# Patient Record
Sex: FEMALE | Race: ASIAN | NOT HISPANIC OR LATINO
[De-identification: names, ages, dates, MRNs, and addresses within clinical notes are randomized per-mention and may not be internally consistent; named-entity substitution may affect disease eponyms.]

---

## 2023-02-22 PROBLEM — Z00.00 ENCOUNTER FOR PREVENTIVE HEALTH EXAMINATION: Status: ACTIVE | Noted: 2023-02-22

## 2023-02-24 ENCOUNTER — APPOINTMENT (OUTPATIENT)
Dept: PULMONOLOGY | Facility: CLINIC | Age: 78
End: 2023-02-24
Payer: MEDICARE

## 2023-02-24 VITALS
HEART RATE: 93 BPM | SYSTOLIC BLOOD PRESSURE: 118 MMHG | BODY MASS INDEX: 26.06 KG/M2 | WEIGHT: 138 LBS | OXYGEN SATURATION: 99 % | HEIGHT: 61 IN | DIASTOLIC BLOOD PRESSURE: 70 MMHG

## 2023-02-24 PROCEDURE — 94729 DIFFUSING CAPACITY: CPT

## 2023-02-24 PROCEDURE — 94010 BREATHING CAPACITY TEST: CPT

## 2023-02-24 PROCEDURE — 94726 PLETHYSMOGRAPHY LUNG VOLUMES: CPT

## 2023-03-02 ENCOUNTER — EMERGENCY (EMERGENCY)
Facility: HOSPITAL | Age: 78
LOS: 1 days | Discharge: HOME | End: 2023-03-02
Attending: EMERGENCY MEDICINE | Admitting: EMERGENCY MEDICINE
Payer: MEDICARE

## 2023-03-02 VITALS
HEART RATE: 104 BPM | TEMPERATURE: 99 F | RESPIRATION RATE: 18 BRPM | DIASTOLIC BLOOD PRESSURE: 62 MMHG | SYSTOLIC BLOOD PRESSURE: 134 MMHG | OXYGEN SATURATION: 98 %

## 2023-03-02 VITALS
OXYGEN SATURATION: 100 % | RESPIRATION RATE: 18 BRPM | HEART RATE: 102 BPM | TEMPERATURE: 98 F | SYSTOLIC BLOOD PRESSURE: 130 MMHG | DIASTOLIC BLOOD PRESSURE: 71 MMHG

## 2023-03-02 LAB
ALBUMIN SERPL ELPH-MCNC: 3.3 G/DL — SIGNIFICANT CHANGE UP (ref 3.3–5)
ALP SERPL-CCNC: 122 U/L — HIGH (ref 40–120)
ALT FLD-CCNC: 11 U/L — SIGNIFICANT CHANGE UP (ref 4–33)
ANION GAP SERPL CALC-SCNC: 12 MMOL/L — SIGNIFICANT CHANGE UP (ref 7–14)
ANISOCYTOSIS BLD QL: SLIGHT — SIGNIFICANT CHANGE UP
AST SERPL-CCNC: 9 U/L — SIGNIFICANT CHANGE UP (ref 4–32)
BASOPHILS # BLD AUTO: 0 K/UL — SIGNIFICANT CHANGE UP (ref 0–0.2)
BASOPHILS NFR BLD AUTO: 0 % — SIGNIFICANT CHANGE UP (ref 0–2)
BILIRUB SERPL-MCNC: 0.3 MG/DL — SIGNIFICANT CHANGE UP (ref 0.2–1.2)
BUN SERPL-MCNC: 14 MG/DL — SIGNIFICANT CHANGE UP (ref 7–23)
CALCIUM SERPL-MCNC: 9.5 MG/DL — SIGNIFICANT CHANGE UP (ref 8.4–10.5)
CHLORIDE SERPL-SCNC: 94 MMOL/L — LOW (ref 98–107)
CO2 SERPL-SCNC: 24 MMOL/L — SIGNIFICANT CHANGE UP (ref 22–31)
CREAT SERPL-MCNC: 0.81 MG/DL — SIGNIFICANT CHANGE UP (ref 0.5–1.3)
EGFR: 76 ML/MIN/1.73M2 — SIGNIFICANT CHANGE UP
ELLIPTOCYTES BLD QL SMEAR: SLIGHT — SIGNIFICANT CHANGE UP
EOSINOPHIL # BLD AUTO: 0 K/UL — SIGNIFICANT CHANGE UP (ref 0–0.5)
EOSINOPHIL NFR BLD AUTO: 0 % — SIGNIFICANT CHANGE UP (ref 0–6)
FLUAV AG NPH QL: SIGNIFICANT CHANGE UP
FLUBV AG NPH QL: SIGNIFICANT CHANGE UP
GLUCOSE SERPL-MCNC: 120 MG/DL — HIGH (ref 70–99)
HCT VFR BLD CALC: 32.7 % — LOW (ref 34.5–45)
HGB BLD-MCNC: 10.7 G/DL — LOW (ref 11.5–15.5)
HYPOCHROMIA BLD QL: SIGNIFICANT CHANGE UP
IANC: 21.73 K/UL — HIGH (ref 1.8–7.4)
LIDOCAIN IGE QN: 29 U/L — SIGNIFICANT CHANGE UP (ref 7–60)
LYMPHOCYTES # BLD AUTO: 1.14 K/UL — SIGNIFICANT CHANGE UP (ref 1–3.3)
LYMPHOCYTES # BLD AUTO: 4.4 % — LOW (ref 13–44)
MCHC RBC-ENTMCNC: 24.8 PG — LOW (ref 27–34)
MCHC RBC-ENTMCNC: 32.7 GM/DL — SIGNIFICANT CHANGE UP (ref 32–36)
MCV RBC AUTO: 75.7 FL — LOW (ref 80–100)
MICROCYTES BLD QL: SLIGHT — SIGNIFICANT CHANGE UP
MONOCYTES # BLD AUTO: 2.03 K/UL — HIGH (ref 0–0.9)
MONOCYTES NFR BLD AUTO: 7.8 % — SIGNIFICANT CHANGE UP (ref 2–14)
MYELOCYTES NFR BLD: 1.7 % — HIGH (ref 0–0)
NEUTROPHILS # BLD AUTO: 22.13 K/UL — HIGH (ref 1.8–7.4)
NEUTROPHILS NFR BLD AUTO: 85.2 % — HIGH (ref 43–77)
OVALOCYTES BLD QL SMEAR: SLIGHT — SIGNIFICANT CHANGE UP
PLAT MORPH BLD: NORMAL — SIGNIFICANT CHANGE UP
PLATELET # BLD AUTO: 608 K/UL — HIGH (ref 150–400)
PLATELET COUNT - ESTIMATE: ABNORMAL
POIKILOCYTOSIS BLD QL AUTO: SLIGHT — SIGNIFICANT CHANGE UP
POLYCHROMASIA BLD QL SMEAR: SIGNIFICANT CHANGE UP
POTASSIUM SERPL-MCNC: 4.4 MMOL/L — SIGNIFICANT CHANGE UP (ref 3.5–5.3)
POTASSIUM SERPL-SCNC: 4.4 MMOL/L — SIGNIFICANT CHANGE UP (ref 3.5–5.3)
PROT SERPL-MCNC: 7.6 G/DL — SIGNIFICANT CHANGE UP (ref 6–8.3)
RBC # BLD: 4.32 M/UL — SIGNIFICANT CHANGE UP (ref 3.8–5.2)
RBC # FLD: 15.9 % — HIGH (ref 10.3–14.5)
RBC BLD AUTO: ABNORMAL
RSV RNA NPH QL NAA+NON-PROBE: SIGNIFICANT CHANGE UP
SARS-COV-2 RNA SPEC QL NAA+PROBE: SIGNIFICANT CHANGE UP
SODIUM SERPL-SCNC: 130 MMOL/L — LOW (ref 135–145)
VARIANT LYMPHS # BLD: 0.9 % — SIGNIFICANT CHANGE UP (ref 0–6)
WBC # BLD: 25.98 K/UL — HIGH (ref 3.8–10.5)
WBC # FLD AUTO: 25.98 K/UL — HIGH (ref 3.8–10.5)

## 2023-03-02 PROCEDURE — 76705 ECHO EXAM OF ABDOMEN: CPT | Mod: 26

## 2023-03-02 PROCEDURE — 99284 EMERGENCY DEPT VISIT MOD MDM: CPT | Mod: GC

## 2023-03-02 RX ORDER — FAMOTIDINE 10 MG/ML
20 INJECTION INTRAVENOUS ONCE
Refills: 0 | Status: COMPLETED | OUTPATIENT
Start: 2023-03-02 | End: 2023-03-02

## 2023-03-02 RX ORDER — ONDANSETRON 8 MG/1
4 TABLET, FILM COATED ORAL ONCE
Refills: 0 | Status: COMPLETED | OUTPATIENT
Start: 2023-03-02 | End: 2023-03-02

## 2023-03-02 RX ORDER — LIDOCAINE 4 G/100G
10 CREAM TOPICAL ONCE
Refills: 0 | Status: COMPLETED | OUTPATIENT
Start: 2023-03-02 | End: 2023-03-02

## 2023-03-02 RX ORDER — SODIUM CHLORIDE 9 MG/ML
1000 INJECTION INTRAMUSCULAR; INTRAVENOUS; SUBCUTANEOUS ONCE
Refills: 0 | Status: COMPLETED | OUTPATIENT
Start: 2023-03-02 | End: 2023-03-02

## 2023-03-02 RX ADMIN — LIDOCAINE 10 MILLILITER(S): 4 CREAM TOPICAL at 17:01

## 2023-03-02 RX ADMIN — Medication 30 MILLILITER(S): at 17:01

## 2023-03-02 RX ADMIN — SODIUM CHLORIDE 1000 MILLILITER(S): 9 INJECTION INTRAMUSCULAR; INTRAVENOUS; SUBCUTANEOUS at 19:55

## 2023-03-02 RX ADMIN — FAMOTIDINE 20 MILLIGRAM(S): 10 INJECTION INTRAVENOUS at 17:01

## 2023-03-02 NOTE — ED ADULT NURSE NOTE - NSIMPLEMENTINTERV_GEN_ALL_ED
Implemented All Fall Risk Interventions:  [place] to call system. Call bell, personal items and telephone within reach. Instruct patient to call for assistance. Room bathroom lighting operational. Non-slip footwear when patient is off stretcher. Physically safe environment: no spills, clutter or unnecessary equipment. Stretcher in lowest position, wheels locked, appropriate side rails in place. Provide visual cue, wrist band, yellow gown, etc. Monitor gait and stability. Monitor for mental status changes and reorient to person, place, and time. Review medications for side effects contributing to fall risk. Reinforce activity limits and safety measures with patient and family.

## 2023-03-02 NOTE — ED PROVIDER NOTE - OBJECTIVE STATEMENT
77f pmh asthma, htn, DM2 presenting to ED for irritation of tongue making it hard for her to eat; and indigestion; no difficulty breathing, some bumps noted on tongue, no gross swelling; pt recently traveled from [place] while she was [place] she had some kind of viral illness ( says not Covid) experienced coughing and low oxygen levels and ended up in ICU (was not intubated) needed oxygen support was eventually d/c'd and traveled home to [place] 10 days later 77f pmh asthma, htn, DM2 presenting to ED for irritation of tongue. Per son, patient has tongue bumps that make it difficult for her to eat in addition to indigestion, bloating, back pain, and loss of appetite. Son states pt recently traveled from [place], had bronchiolitis and admitted to ICU (was not intubated). Since returning to [place] on 2/16, patient started having weakness and chills and saw her PCP 2/23 and given abx for UTI. Pt also endorses constipation and vomiting but otherwise denies fever, nausea, tongue swelling, difficult breathing, CP, SOB, numbness, dizziness, urinary sx.    PCP: Jose Roberto Sprague

## 2023-03-02 NOTE — ED PROVIDER NOTE - NSPTACCESSSVCSAPPTDETAILS_ED_ALL_ED_FT
Please help patient arrange a follow-up with a general surgeon in their office for elective cholecystectomy.

## 2023-03-02 NOTE — ED PROVIDER NOTE - PATIENT PORTAL LINK FT
You can access the FollowMyHealth Patient Portal offered by [facility] by registering at the following website: [email or web address]. By joining [employer]’s FollowMyHealth portal, you will also be able to view your health information using other applications (apps) compatible with our system.

## 2023-03-02 NOTE — ED PROVIDER NOTE - PHYSICAL EXAMINATION
CONSTITUTIONAL: NAD  HEAD: normocephalic, atraumatic  EYES: PERRLA, conjunctiva and sclera clear  ENMT: Moist oral mucosa, + non-exudative white lesions right buccal mucosa  RESPIRATORY: Normal respiratory effort; lungs are clear to auscultation bilaterally  CARDIOVASCULAR: Regular rate and rhythm, normal S1 and S2, no lower extremity edema  ABDOMEN: +RUQ tender to palpation, positive bowel sounds, no rebound/guarding  MUSCULOSKELETAL: No joint swelling or tenderness to palpation, no cyanosis or edema  NEUROLOGY: Alert, oriented, CN 2-12 intact and symmetric  SKIN: warm, dry

## 2023-03-02 NOTE — ED PROVIDER NOTE - CLINICAL SUMMARY MEDICAL DECISION MAKING FREE TEXT BOX
77f pmh asthma, htn, DM2 presenting for tongue irritation, indigestion, back pain, loss of appetite, constipation and vomiting. Son states pt recently traveled from [place], had bronchiolitis and admitted to ICU. Saw PCP 2/23 and given abx for UTI. Denies fever, nausea, tongue swelling, difficult breathing, CP, SOB, numbness, dizziness, urinary sx. Plan for infectious workup, RUQ US and give pain medications.

## 2023-03-02 NOTE — ED ADULT TRIAGE NOTE - AS TEMP SITE
oral I have personally performed a face to face diagnostic evaluation on this patient. I have reviewed the ACP note and agree with the history, exam and plan of care, except as noted.

## 2023-03-02 NOTE — ED PROVIDER NOTE - PROGRESS NOTE DETAILS
[place] PGY1 - signout - 77-year-old female with a history of DM, HTN presenting with abdominal distention, gas GERD travel to [place].  WBC 25.98.  Sodium 130.  Lipase unremarkable.  RUQ ultrasound showed cholelithiasis without acute cholecystitis.  UA pending.

## 2023-03-02 NOTE — ED ADULT NURSE NOTE - OBJECTIVE STATEMENT
Received patient in room 16 c/o irritation of tongue and indigestion today, patient denies SOB, chest pain, fever. Patient speaks [place],  at bedside for translation as per patient's request. Patient is A&Ox4, airway patent, breathing unlabored and even, radial pulses palpable. Awaiting MD evaluation. Side rails up and safety maintained. Fall precaution in place. Call bells within reach. Family at bedside. Received patient in room 16 c/o irritation of tongue and indigestion today, patient denies SOB, chest pain, fever. Patient speaks [place],  at bedside for translation as per patient's request. Patient is A&Ox4, airway patent, breathing unlabored and even, radial pulses palpable. Labs obtained, 20G IV placed on left arm, medications given as ordered, awaiting US. Side rails up and safety maintained. Fall precaution in place. Call bells within reach. Family at bedside.

## 2023-03-02 NOTE — ED ADULT TRIAGE NOTE - CHIEF COMPLAINT QUOTE
Pt AOX4 speaks [place]jarati  is translating; c/o irritation of tongue making it hard for her to eat; and indigestion; no difficulty breathing, some bumps noted on tongue, no gross swelling; pt recently traveled from [place] while she was [place] she had some kind of viral illness ( says not Covid) experienced coughing and low oxygen levels and ended up in ICU (was not intubated) needed oxygen support was eventually d/c'd and traveled home to [place] 10 days later  fingerstick glucose: 130

## 2023-03-02 NOTE — ED ADULT TRIAGE NOTE - INTERNATIONAL TRAVEL DAYS 1
of education: Not on file    Highest education level: Not on file   Occupational History    Not on file   Social Needs    Financial resource strain: Not on file    Food insecurity     Worry: Not on file     Inability: Not on file    Transportation needs     Medical: Not on file     Non-medical: Not on file   Tobacco Use    Smoking status: Never Smoker    Smokeless tobacco: Never Used   Substance and Sexual Activity    Alcohol use: Never    Drug use: Never    Sexual activity: Not on file   Lifestyle    Physical activity     Days per week: Not on file     Minutes per session: Not on file    Stress: Not on file   Relationships    Social connections     Talks on phone: Not on file     Gets together: Not on file     Attends Synagogue service: Not on file     Active member of club or organization: Not on file     Attends meetings of clubs or organizations: Not on file     Relationship status: Not on file    Intimate partner violence     Fear of current or ex partner: Not on file     Emotionally abused: Not on file     Physically abused: Not on file     Forced sexual activity: Not on file   Other Topics Concern    Not on file   Social History Narrative    Not on file       Family History:  No family history on file. Review of Systems:  Constitutional: Denies fevers, chills, or weight loss. HEENT: Denies visual changes or hearing loss. Heart: As per HPI. Lungs: Denies shortness of breath, cough, or wheezing. Gastrointestinal: Denies nausea, vomiting, constipation, or diarrhea. Genitourinary: dysuria or hematuria. Psychiatric: Patient denies anxiety or depression. Neurologic: Patient denies weakness of the extremities, dizziness, or headaches. All other ROS checked and found to be negative.     Labs:  Recent Labs     03/09/20  1209 03/10/20  0211 03/11/20  0338   WBC 4.2* 4.9 4.5   HGB 12.6 11.4* 11.5*   HCT 41.3 38.1 39.0    153 154      Recent Labs     03/10/20  0211 03/11/20  5319
7-14 days ([place])

## 2023-03-02 NOTE — ED PROVIDER NOTE - NS ED ROS FT
CONSTITUTIONAL: + weakness, chills, loss of appetite, no fevers  EYES/ENT: No visual changes, + tongue irritation  NECK: No pain or stiffness  RESPIRATORY: No cough, wheezing, or shortness of breath  CARDIOVASCULAR: No chest pain or palpitations  GASTROINTESTINAL: + abdominal pain, vomiting, constipation, no nausea   GENITOURINARY: No dysuria, frequency or hematuria  NEUROLOGICAL: No numbness, headache, or dizziness  MUSCULOSKELETAL: No joint pain, no swelling, no back pain  SKIN: No itching, rashes

## 2023-03-02 NOTE — ED ADULT NURSE REASSESSMENT NOTE - NS ED NURSE REASSESS COMMENT FT1
Pt in room 16. A&Ox4, amb at baseline. Vitally stable. Denies chest pain, SOB, nausea, vomiting, headache, dizziness, numbness/tingling to hands/feet. Pt currently endorsing hunger. PO challenged and passed. Urine sent. Awaiting orders

## 2023-03-02 NOTE — ED PROVIDER NOTE - NSFOLLOWUPINSTRUCTIONS_ED_ALL_ED_FT
Please follow up with your primary care physician within 1 week of discharge from the emergency department.  Please follow-up with a general surgeon in their office.   Please bring a copy of your results with you.  Please return to the emergency department for worsening of your symptoms.    You may take Acetaminophen over the counter as needed for pain and/or fever. Use as directed and see medication warnings.  You may take Ibuprofen over the counter as needed for pain and/or fever. Use as directed and see medication warnings.    DISCHARGE INSTRUCTIONS:  Seek care immediately if:  You vomit blood or cannot stop vomiting.  You have blood in your bowel movement or it looks like tar.  You have bleeding from your rectum.  Your abdomen is larger than usual, more painful, and hard.  You have severe pain in your abdomen.  You stop passing gas and having bowel movements.  You feel weak, dizzy, or faint.    Contact your healthcare provider if:  You have a fever.  You have new signs and symptoms.  Your symptoms do not get better with treatment.  You have questions or concerns about your condition or care.

## 2023-03-02 NOTE — ED PROVIDER NOTE - ATTENDING CONTRIBUTION TO CARE
77f pmh asthma, htn, DM2 presenting to ED for irritation of tongue. Per son, patient has tongue bumps that make it difficult for her to eat in addition to indigestion, bloating, back pain, and loss of appetite. Son states pt recently traveled from [place], had bronchiolitis and admitted to ICU (was not intubated). Since returning to [place] on 2/16, patient started having weakness and chills and saw her PCP 2/23 and given abx for UTI. Pt also endorses constipation and vomiting but otherwise denies fever, nausea, tongue swelling, difficult breathing, CP, SOB, numbness, dizziness, urinary sx.

## 2023-03-08 LAB
CULTURE RESULTS: SIGNIFICANT CHANGE UP
CULTURE RESULTS: SIGNIFICANT CHANGE UP
SPECIMEN SOURCE: SIGNIFICANT CHANGE UP
SPECIMEN SOURCE: SIGNIFICANT CHANGE UP

## 2024-01-09 ENCOUNTER — TRANSCRIPTION ENCOUNTER (OUTPATIENT)
Age: 79
End: 2024-01-09

## 2024-01-10 ENCOUNTER — TRANSCRIPTION ENCOUNTER (OUTPATIENT)
Age: 79
End: 2024-01-10

## 2024-01-10 ENCOUNTER — INPATIENT (INPATIENT)
Facility: HOSPITAL | Age: 79
LOS: 5 days | Discharge: NURSING, REHAB OR LONG-TERM CARE | End: 2024-01-16
Attending: STUDENT IN AN ORGANIZED HEALTH CARE EDUCATION/TRAINING PROGRAM | Admitting: STUDENT IN AN ORGANIZED HEALTH CARE EDUCATION/TRAINING PROGRAM
Payer: MEDICARE

## 2024-01-10 VITALS
RESPIRATION RATE: 18 BRPM | SYSTOLIC BLOOD PRESSURE: 121 MMHG | HEART RATE: 94 BPM | TEMPERATURE: 99 F | OXYGEN SATURATION: 99 % | DIASTOLIC BLOOD PRESSURE: 76 MMHG

## 2024-01-10 DIAGNOSIS — S72.142A DISPLACED INTERTROCHANTERIC FRACTURE OF LEFT FEMUR, INITIAL ENCOUNTER FOR CLOSED FRACTURE: ICD-10-CM

## 2024-01-10 PROBLEM — I10 ESSENTIAL (PRIMARY) HYPERTENSION: Chronic | Status: ACTIVE | Noted: 2023-03-02

## 2024-01-10 PROBLEM — J45.909 UNSPECIFIED ASTHMA, UNCOMPLICATED: Chronic | Status: ACTIVE | Noted: 2023-03-02

## 2024-01-10 LAB
ALBUMIN SERPL ELPH-MCNC: 4 G/DL — SIGNIFICANT CHANGE UP (ref 3.3–5)
ALBUMIN SERPL ELPH-MCNC: 4 G/DL — SIGNIFICANT CHANGE UP (ref 3.3–5)
ALP SERPL-CCNC: 78 U/L — SIGNIFICANT CHANGE UP (ref 40–120)
ALP SERPL-CCNC: 78 U/L — SIGNIFICANT CHANGE UP (ref 40–120)
ALT FLD-CCNC: 13 U/L — SIGNIFICANT CHANGE UP (ref 4–33)
ALT FLD-CCNC: 13 U/L — SIGNIFICANT CHANGE UP (ref 4–33)
ANION GAP SERPL CALC-SCNC: 10 MMOL/L — SIGNIFICANT CHANGE UP (ref 7–14)
APTT BLD: 28 SEC — SIGNIFICANT CHANGE UP (ref 24.5–35.6)
APTT BLD: 28 SEC — SIGNIFICANT CHANGE UP (ref 24.5–35.6)
AST SERPL-CCNC: 14 U/L — SIGNIFICANT CHANGE UP (ref 4–32)
AST SERPL-CCNC: 14 U/L — SIGNIFICANT CHANGE UP (ref 4–32)
BASOPHILS # BLD AUTO: 0.07 K/UL — SIGNIFICANT CHANGE UP (ref 0–0.2)
BASOPHILS # BLD AUTO: 0.07 K/UL — SIGNIFICANT CHANGE UP (ref 0–0.2)
BASOPHILS NFR BLD AUTO: 0.5 % — SIGNIFICANT CHANGE UP (ref 0–2)
BASOPHILS NFR BLD AUTO: 0.5 % — SIGNIFICANT CHANGE UP (ref 0–2)
BILIRUB SERPL-MCNC: 0.3 MG/DL — SIGNIFICANT CHANGE UP (ref 0.2–1.2)
BILIRUB SERPL-MCNC: 0.3 MG/DL — SIGNIFICANT CHANGE UP (ref 0.2–1.2)
BLD GP AB SCN SERPL QL: NEGATIVE — SIGNIFICANT CHANGE UP
BLD GP AB SCN SERPL QL: NEGATIVE — SIGNIFICANT CHANGE UP
BUN SERPL-MCNC: 12 MG/DL — SIGNIFICANT CHANGE UP (ref 7–23)
BUN SERPL-MCNC: 12 MG/DL — SIGNIFICANT CHANGE UP (ref 7–23)
BUN SERPL-MCNC: 13 MG/DL — SIGNIFICANT CHANGE UP (ref 7–23)
BUN SERPL-MCNC: 13 MG/DL — SIGNIFICANT CHANGE UP (ref 7–23)
CALCIUM SERPL-MCNC: 8.4 MG/DL — SIGNIFICANT CHANGE UP (ref 8.4–10.5)
CALCIUM SERPL-MCNC: 8.4 MG/DL — SIGNIFICANT CHANGE UP (ref 8.4–10.5)
CALCIUM SERPL-MCNC: 8.9 MG/DL — SIGNIFICANT CHANGE UP (ref 8.4–10.5)
CALCIUM SERPL-MCNC: 8.9 MG/DL — SIGNIFICANT CHANGE UP (ref 8.4–10.5)
CHLORIDE SERPL-SCNC: 102 MMOL/L — SIGNIFICANT CHANGE UP (ref 98–107)
CHLORIDE SERPL-SCNC: 102 MMOL/L — SIGNIFICANT CHANGE UP (ref 98–107)
CHLORIDE SERPL-SCNC: 104 MMOL/L — SIGNIFICANT CHANGE UP (ref 98–107)
CHLORIDE SERPL-SCNC: 104 MMOL/L — SIGNIFICANT CHANGE UP (ref 98–107)
CO2 SERPL-SCNC: 23 MMOL/L — SIGNIFICANT CHANGE UP (ref 22–31)
CO2 SERPL-SCNC: 23 MMOL/L — SIGNIFICANT CHANGE UP (ref 22–31)
CO2 SERPL-SCNC: 25 MMOL/L — SIGNIFICANT CHANGE UP (ref 22–31)
CO2 SERPL-SCNC: 25 MMOL/L — SIGNIFICANT CHANGE UP (ref 22–31)
CREAT SERPL-MCNC: 0.58 MG/DL — SIGNIFICANT CHANGE UP (ref 0.5–1.3)
CREAT SERPL-MCNC: 0.58 MG/DL — SIGNIFICANT CHANGE UP (ref 0.5–1.3)
CREAT SERPL-MCNC: 0.61 MG/DL — SIGNIFICANT CHANGE UP (ref 0.5–1.3)
CREAT SERPL-MCNC: 0.61 MG/DL — SIGNIFICANT CHANGE UP (ref 0.5–1.3)
EGFR: 93 ML/MIN/1.73M2 — SIGNIFICANT CHANGE UP
EGFR: 93 ML/MIN/1.73M2 — SIGNIFICANT CHANGE UP
EGFR: 94 ML/MIN/1.73M2 — SIGNIFICANT CHANGE UP
EGFR: 94 ML/MIN/1.73M2 — SIGNIFICANT CHANGE UP
EOSINOPHIL # BLD AUTO: 0.12 K/UL — SIGNIFICANT CHANGE UP (ref 0–0.5)
EOSINOPHIL # BLD AUTO: 0.12 K/UL — SIGNIFICANT CHANGE UP (ref 0–0.5)
EOSINOPHIL NFR BLD AUTO: 0.9 % — SIGNIFICANT CHANGE UP (ref 0–6)
EOSINOPHIL NFR BLD AUTO: 0.9 % — SIGNIFICANT CHANGE UP (ref 0–6)
GLUCOSE BLDC GLUCOMTR-MCNC: 127 MG/DL — HIGH (ref 70–99)
GLUCOSE BLDC GLUCOMTR-MCNC: 127 MG/DL — HIGH (ref 70–99)
GLUCOSE BLDC GLUCOMTR-MCNC: 184 MG/DL — HIGH (ref 70–99)
GLUCOSE BLDC GLUCOMTR-MCNC: 184 MG/DL — HIGH (ref 70–99)
GLUCOSE SERPL-MCNC: 158 MG/DL — HIGH (ref 70–99)
GLUCOSE SERPL-MCNC: 158 MG/DL — HIGH (ref 70–99)
GLUCOSE SERPL-MCNC: 160 MG/DL — HIGH (ref 70–99)
GLUCOSE SERPL-MCNC: 160 MG/DL — HIGH (ref 70–99)
HCT VFR BLD CALC: 36.3 % — SIGNIFICANT CHANGE UP (ref 34.5–45)
HCT VFR BLD CALC: 36.3 % — SIGNIFICANT CHANGE UP (ref 34.5–45)
HCT VFR BLD CALC: 36.9 % — SIGNIFICANT CHANGE UP (ref 34.5–45)
HCT VFR BLD CALC: 36.9 % — SIGNIFICANT CHANGE UP (ref 34.5–45)
HGB BLD-MCNC: 11.6 G/DL — SIGNIFICANT CHANGE UP (ref 11.5–15.5)
IANC: 10.94 K/UL — HIGH (ref 1.8–7.4)
IANC: 10.94 K/UL — HIGH (ref 1.8–7.4)
IMM GRANULOCYTES NFR BLD AUTO: 1.2 % — HIGH (ref 0–0.9)
IMM GRANULOCYTES NFR BLD AUTO: 1.2 % — HIGH (ref 0–0.9)
INR BLD: 0.99 RATIO — SIGNIFICANT CHANGE UP (ref 0.85–1.18)
INR BLD: 0.99 RATIO — SIGNIFICANT CHANGE UP (ref 0.85–1.18)
LYMPHOCYTES # BLD AUTO: 1.6 K/UL — SIGNIFICANT CHANGE UP (ref 1–3.3)
LYMPHOCYTES # BLD AUTO: 1.6 K/UL — SIGNIFICANT CHANGE UP (ref 1–3.3)
LYMPHOCYTES # BLD AUTO: 11.8 % — LOW (ref 13–44)
LYMPHOCYTES # BLD AUTO: 11.8 % — LOW (ref 13–44)
MAGNESIUM SERPL-MCNC: 1.9 MG/DL — SIGNIFICANT CHANGE UP (ref 1.6–2.6)
MAGNESIUM SERPL-MCNC: 1.9 MG/DL — SIGNIFICANT CHANGE UP (ref 1.6–2.6)
MCHC RBC-ENTMCNC: 25.8 PG — LOW (ref 27–34)
MCHC RBC-ENTMCNC: 25.8 PG — LOW (ref 27–34)
MCHC RBC-ENTMCNC: 26 PG — LOW (ref 27–34)
MCHC RBC-ENTMCNC: 26 PG — LOW (ref 27–34)
MCHC RBC-ENTMCNC: 31.4 GM/DL — LOW (ref 32–36)
MCHC RBC-ENTMCNC: 31.4 GM/DL — LOW (ref 32–36)
MCHC RBC-ENTMCNC: 32 GM/DL — SIGNIFICANT CHANGE UP (ref 32–36)
MCHC RBC-ENTMCNC: 32 GM/DL — SIGNIFICANT CHANGE UP (ref 32–36)
MCV RBC AUTO: 81.4 FL — SIGNIFICANT CHANGE UP (ref 80–100)
MCV RBC AUTO: 81.4 FL — SIGNIFICANT CHANGE UP (ref 80–100)
MCV RBC AUTO: 82 FL — SIGNIFICANT CHANGE UP (ref 80–100)
MCV RBC AUTO: 82 FL — SIGNIFICANT CHANGE UP (ref 80–100)
MONOCYTES # BLD AUTO: 0.66 K/UL — SIGNIFICANT CHANGE UP (ref 0–0.9)
MONOCYTES # BLD AUTO: 0.66 K/UL — SIGNIFICANT CHANGE UP (ref 0–0.9)
MONOCYTES NFR BLD AUTO: 4.9 % — SIGNIFICANT CHANGE UP (ref 2–14)
MONOCYTES NFR BLD AUTO: 4.9 % — SIGNIFICANT CHANGE UP (ref 2–14)
NEUTROPHILS # BLD AUTO: 10.94 K/UL — HIGH (ref 1.8–7.4)
NEUTROPHILS # BLD AUTO: 10.94 K/UL — HIGH (ref 1.8–7.4)
NEUTROPHILS NFR BLD AUTO: 80.7 % — HIGH (ref 43–77)
NEUTROPHILS NFR BLD AUTO: 80.7 % — HIGH (ref 43–77)
NRBC # BLD: 0 /100 WBCS — SIGNIFICANT CHANGE UP (ref 0–0)
NRBC # FLD: 0 K/UL — SIGNIFICANT CHANGE UP (ref 0–0)
PHOSPHATE SERPL-MCNC: 3.2 MG/DL — SIGNIFICANT CHANGE UP (ref 2.5–4.5)
PHOSPHATE SERPL-MCNC: 3.2 MG/DL — SIGNIFICANT CHANGE UP (ref 2.5–4.5)
PLATELET # BLD AUTO: 253 K/UL — SIGNIFICANT CHANGE UP (ref 150–400)
PLATELET # BLD AUTO: 253 K/UL — SIGNIFICANT CHANGE UP (ref 150–400)
PLATELET # BLD AUTO: 296 K/UL — SIGNIFICANT CHANGE UP (ref 150–400)
PLATELET # BLD AUTO: 296 K/UL — SIGNIFICANT CHANGE UP (ref 150–400)
POTASSIUM SERPL-MCNC: 4 MMOL/L — SIGNIFICANT CHANGE UP (ref 3.5–5.3)
POTASSIUM SERPL-MCNC: 4 MMOL/L — SIGNIFICANT CHANGE UP (ref 3.5–5.3)
POTASSIUM SERPL-MCNC: 4.6 MMOL/L — SIGNIFICANT CHANGE UP (ref 3.5–5.3)
POTASSIUM SERPL-MCNC: 4.6 MMOL/L — SIGNIFICANT CHANGE UP (ref 3.5–5.3)
POTASSIUM SERPL-SCNC: 4 MMOL/L — SIGNIFICANT CHANGE UP (ref 3.5–5.3)
POTASSIUM SERPL-SCNC: 4 MMOL/L — SIGNIFICANT CHANGE UP (ref 3.5–5.3)
POTASSIUM SERPL-SCNC: 4.6 MMOL/L — SIGNIFICANT CHANGE UP (ref 3.5–5.3)
POTASSIUM SERPL-SCNC: 4.6 MMOL/L — SIGNIFICANT CHANGE UP (ref 3.5–5.3)
PROT SERPL-MCNC: 6.8 G/DL — SIGNIFICANT CHANGE UP (ref 6–8.3)
PROT SERPL-MCNC: 6.8 G/DL — SIGNIFICANT CHANGE UP (ref 6–8.3)
PROTHROM AB SERPL-ACNC: 11.1 SEC — SIGNIFICANT CHANGE UP (ref 9.5–13)
PROTHROM AB SERPL-ACNC: 11.1 SEC — SIGNIFICANT CHANGE UP (ref 9.5–13)
RBC # BLD: 4.46 M/UL — SIGNIFICANT CHANGE UP (ref 3.8–5.2)
RBC # BLD: 4.46 M/UL — SIGNIFICANT CHANGE UP (ref 3.8–5.2)
RBC # BLD: 4.5 M/UL — SIGNIFICANT CHANGE UP (ref 3.8–5.2)
RBC # BLD: 4.5 M/UL — SIGNIFICANT CHANGE UP (ref 3.8–5.2)
RBC # FLD: 14.6 % — HIGH (ref 10.3–14.5)
RBC # FLD: 14.6 % — HIGH (ref 10.3–14.5)
RBC # FLD: 14.7 % — HIGH (ref 10.3–14.5)
RBC # FLD: 14.7 % — HIGH (ref 10.3–14.5)
RH IG SCN BLD-IMP: POSITIVE — SIGNIFICANT CHANGE UP
RH IG SCN BLD-IMP: POSITIVE — SIGNIFICANT CHANGE UP
SODIUM SERPL-SCNC: 137 MMOL/L — SIGNIFICANT CHANGE UP (ref 135–145)
WBC # BLD: 13.55 K/UL — HIGH (ref 3.8–10.5)
WBC # BLD: 13.55 K/UL — HIGH (ref 3.8–10.5)
WBC # BLD: 17.66 K/UL — HIGH (ref 3.8–10.5)
WBC # BLD: 17.66 K/UL — HIGH (ref 3.8–10.5)
WBC # FLD AUTO: 13.55 K/UL — HIGH (ref 3.8–10.5)
WBC # FLD AUTO: 13.55 K/UL — HIGH (ref 3.8–10.5)
WBC # FLD AUTO: 17.66 K/UL — HIGH (ref 3.8–10.5)
WBC # FLD AUTO: 17.66 K/UL — HIGH (ref 3.8–10.5)

## 2024-01-10 PROCEDURE — 27245 TREAT THIGH FRACTURE: CPT | Mod: LT

## 2024-01-10 PROCEDURE — 71045 X-RAY EXAM CHEST 1 VIEW: CPT | Mod: 26

## 2024-01-10 PROCEDURE — 99285 EMERGENCY DEPT VISIT HI MDM: CPT | Mod: FS

## 2024-01-10 PROCEDURE — 99222 1ST HOSP IP/OBS MODERATE 55: CPT | Mod: FS,57

## 2024-01-10 PROCEDURE — 73502 X-RAY EXAM HIP UNI 2-3 VIEWS: CPT | Mod: 26,LT

## 2024-01-10 PROCEDURE — 73552 X-RAY EXAM OF FEMUR 2/>: CPT | Mod: 26,LT

## 2024-01-10 PROCEDURE — 99223 1ST HOSP IP/OBS HIGH 75: CPT

## 2024-01-10 DEVICE — K-WIRE STRYKER 3.2MM X 450MM: Type: IMPLANTABLE DEVICE | Site: LEFT | Status: FUNCTIONAL

## 2024-01-10 DEVICE — PIN ORTHO PRECISION TAPER 3.9X450MM: Type: IMPLANTABLE DEVICE | Site: LEFT | Status: FUNCTIONAL

## 2024-01-10 DEVICE — SCREW GAMMA LAG 10.5X90MM STRL: Type: IMPLANTABLE DEVICE | Site: LEFT | Status: FUNCTIONAL

## 2024-01-10 DEVICE — NAIL INTRAMED TROCHANTER 125 DEG 10X170MM: Type: IMPLANTABLE DEVICE | Site: LEFT | Status: FUNCTIONAL

## 2024-01-10 DEVICE — SCREW LOKG 5X35MM STRL: Type: IMPLANTABLE DEVICE | Site: LEFT | Status: FUNCTIONAL

## 2024-01-10 RX ORDER — CHLORHEXIDINE GLUCONATE 213 G/1000ML
1 SOLUTION TOPICAL ONCE
Refills: 0 | Status: COMPLETED | OUTPATIENT
Start: 2024-01-10 | End: 2024-01-10

## 2024-01-10 RX ORDER — INSULIN LISPRO 100/ML
VIAL (ML) SUBCUTANEOUS AT BEDTIME
Refills: 0 | Status: DISCONTINUED | OUTPATIENT
Start: 2024-01-10 | End: 2024-01-16

## 2024-01-10 RX ORDER — VALSARTAN 80 MG/1
320 TABLET ORAL DAILY
Refills: 0 | Status: DISCONTINUED | OUTPATIENT
Start: 2024-01-10 | End: 2024-01-10

## 2024-01-10 RX ORDER — LANOLIN ALCOHOL/MO/W.PET/CERES
3 CREAM (GRAM) TOPICAL AT BEDTIME
Refills: 0 | Status: DISCONTINUED | OUTPATIENT
Start: 2024-01-10 | End: 2024-01-16

## 2024-01-10 RX ORDER — SODIUM CHLORIDE 9 MG/ML
1000 INJECTION, SOLUTION INTRAVENOUS
Refills: 0 | Status: DISCONTINUED | OUTPATIENT
Start: 2024-01-10 | End: 2024-01-16

## 2024-01-10 RX ORDER — METFORMIN HYDROCHLORIDE 850 MG/1
1 TABLET ORAL
Refills: 0 | DISCHARGE

## 2024-01-10 RX ORDER — ACETAMINOPHEN 500 MG
975 TABLET ORAL EVERY 8 HOURS
Refills: 0 | Status: DISCONTINUED | OUTPATIENT
Start: 2024-01-10 | End: 2024-01-16

## 2024-01-10 RX ORDER — ALBUTEROL 90 UG/1
2 AEROSOL, METERED ORAL EVERY 6 HOURS
Refills: 0 | Status: DISCONTINUED | OUTPATIENT
Start: 2024-01-10 | End: 2024-01-16

## 2024-01-10 RX ORDER — POLYETHYLENE GLYCOL 3350 17 G/17G
17 POWDER, FOR SOLUTION ORAL DAILY
Refills: 0 | Status: DISCONTINUED | OUTPATIENT
Start: 2024-01-10 | End: 2024-01-16

## 2024-01-10 RX ORDER — FAMOTIDINE 10 MG/ML
1 INJECTION INTRAVENOUS
Refills: 0 | DISCHARGE

## 2024-01-10 RX ORDER — ENOXAPARIN SODIUM 100 MG/ML
40 INJECTION SUBCUTANEOUS EVERY 24 HOURS
Refills: 0 | Status: DISCONTINUED | OUTPATIENT
Start: 2024-01-11 | End: 2024-01-16

## 2024-01-10 RX ORDER — VALSARTAN 80 MG/1
320 TABLET ORAL DAILY
Refills: 0 | Status: DISCONTINUED | OUTPATIENT
Start: 2024-01-10 | End: 2024-01-16

## 2024-01-10 RX ORDER — GLUCAGON INJECTION, SOLUTION 0.5 MG/.1ML
1 INJECTION, SOLUTION SUBCUTANEOUS ONCE
Refills: 0 | Status: DISCONTINUED | OUTPATIENT
Start: 2024-01-10 | End: 2024-01-16

## 2024-01-10 RX ORDER — DEXTROSE 50 % IN WATER 50 %
12.5 SYRINGE (ML) INTRAVENOUS ONCE
Refills: 0 | Status: DISCONTINUED | OUTPATIENT
Start: 2024-01-10 | End: 2024-01-16

## 2024-01-10 RX ORDER — PANTOPRAZOLE SODIUM 20 MG/1
40 TABLET, DELAYED RELEASE ORAL
Refills: 0 | Status: DISCONTINUED | OUTPATIENT
Start: 2024-01-10 | End: 2024-01-16

## 2024-01-10 RX ORDER — FENTANYL CITRATE 50 UG/ML
25 INJECTION INTRAVENOUS
Refills: 0 | Status: DISCONTINUED | OUTPATIENT
Start: 2024-01-10 | End: 2024-01-11

## 2024-01-10 RX ORDER — ONDANSETRON 8 MG/1
4 TABLET, FILM COATED ORAL ONCE
Refills: 0 | Status: COMPLETED | OUTPATIENT
Start: 2024-01-10 | End: 2024-01-10

## 2024-01-10 RX ORDER — POVIDONE-IODINE 5 %
1 AEROSOL (ML) TOPICAL ONCE
Refills: 0 | Status: COMPLETED | OUTPATIENT
Start: 2024-01-10 | End: 2024-01-10

## 2024-01-10 RX ORDER — DEXTROSE 50 % IN WATER 50 %
15 SYRINGE (ML) INTRAVENOUS ONCE
Refills: 0 | Status: DISCONTINUED | OUTPATIENT
Start: 2024-01-10 | End: 2024-01-16

## 2024-01-10 RX ORDER — FENTANYL CITRATE 50 UG/ML
50 INJECTION INTRAVENOUS
Refills: 0 | Status: DISCONTINUED | OUTPATIENT
Start: 2024-01-10 | End: 2024-01-11

## 2024-01-10 RX ORDER — DEXTROSE 50 % IN WATER 50 %
25 SYRINGE (ML) INTRAVENOUS ONCE
Refills: 0 | Status: DISCONTINUED | OUTPATIENT
Start: 2024-01-10 | End: 2024-01-16

## 2024-01-10 RX ORDER — MONTELUKAST 4 MG/1
10 TABLET, CHEWABLE ORAL DAILY
Refills: 0 | Status: DISCONTINUED | OUTPATIENT
Start: 2024-01-10 | End: 2024-01-16

## 2024-01-10 RX ORDER — SENNA PLUS 8.6 MG/1
2 TABLET ORAL AT BEDTIME
Refills: 0 | Status: DISCONTINUED | OUTPATIENT
Start: 2024-01-10 | End: 2024-01-16

## 2024-01-10 RX ORDER — HYDROMORPHONE HYDROCHLORIDE 2 MG/ML
0.5 INJECTION INTRAMUSCULAR; INTRAVENOUS; SUBCUTANEOUS
Refills: 0 | Status: DISCONTINUED | OUTPATIENT
Start: 2024-01-10 | End: 2024-01-11

## 2024-01-10 RX ORDER — INSULIN LISPRO 100/ML
VIAL (ML) SUBCUTANEOUS AT BEDTIME
Refills: 0 | Status: DISCONTINUED | OUTPATIENT
Start: 2024-01-10 | End: 2024-01-10

## 2024-01-10 RX ORDER — INSULIN LISPRO 100/ML
VIAL (ML) SUBCUTANEOUS
Refills: 0 | Status: DISCONTINUED | OUTPATIENT
Start: 2024-01-10 | End: 2024-01-16

## 2024-01-10 RX ORDER — SODIUM CHLORIDE 9 MG/ML
1000 INJECTION INTRAMUSCULAR; INTRAVENOUS; SUBCUTANEOUS
Refills: 0 | Status: DISCONTINUED | OUTPATIENT
Start: 2024-01-10 | End: 2024-01-14

## 2024-01-10 RX ORDER — MONTELUKAST 4 MG/1
1 TABLET, CHEWABLE ORAL
Refills: 0 | DISCHARGE

## 2024-01-10 RX ORDER — VALSARTAN 80 MG/1
1 TABLET ORAL
Refills: 0 | DISCHARGE

## 2024-01-10 RX ORDER — INSULIN LISPRO 100/ML
VIAL (ML) SUBCUTANEOUS
Refills: 0 | Status: DISCONTINUED | OUTPATIENT
Start: 2024-01-10 | End: 2024-01-10

## 2024-01-10 RX ORDER — OXYCODONE HYDROCHLORIDE 5 MG/1
10 TABLET ORAL EVERY 4 HOURS
Refills: 0 | Status: DISCONTINUED | OUTPATIENT
Start: 2024-01-10 | End: 2024-01-16

## 2024-01-10 RX ORDER — OXYCODONE HYDROCHLORIDE 5 MG/1
5 TABLET ORAL EVERY 4 HOURS
Refills: 0 | Status: DISCONTINUED | OUTPATIENT
Start: 2024-01-10 | End: 2024-01-16

## 2024-01-10 RX ORDER — ACETAMINOPHEN 500 MG
1000 TABLET ORAL ONCE
Refills: 0 | Status: COMPLETED | OUTPATIENT
Start: 2024-01-10 | End: 2024-01-10

## 2024-01-10 RX ORDER — CEFAZOLIN SODIUM 1 G
2000 VIAL (EA) INJECTION EVERY 8 HOURS
Refills: 0 | Status: COMPLETED | OUTPATIENT
Start: 2024-01-11 | End: 2024-01-11

## 2024-01-10 RX ADMIN — ONDANSETRON 4 MILLIGRAM(S): 8 TABLET, FILM COATED ORAL at 09:05

## 2024-01-10 RX ADMIN — HYDROMORPHONE HYDROCHLORIDE 0.5 MILLIGRAM(S): 2 INJECTION INTRAMUSCULAR; INTRAVENOUS; SUBCUTANEOUS at 20:15

## 2024-01-10 RX ADMIN — HYDROMORPHONE HYDROCHLORIDE 0.5 MILLIGRAM(S): 2 INJECTION INTRAMUSCULAR; INTRAVENOUS; SUBCUTANEOUS at 20:00

## 2024-01-10 RX ADMIN — CHLORHEXIDINE GLUCONATE 1 APPLICATION(S): 213 SOLUTION TOPICAL at 16:52

## 2024-01-10 RX ADMIN — SENNA PLUS 2 TABLET(S): 8.6 TABLET ORAL at 21:46

## 2024-01-10 RX ADMIN — Medication 0: at 21:45

## 2024-01-10 RX ADMIN — SODIUM CHLORIDE 100 MILLILITER(S): 9 INJECTION INTRAMUSCULAR; INTRAVENOUS; SUBCUTANEOUS at 13:21

## 2024-01-10 RX ADMIN — Medication 1 APPLICATION(S): at 16:53

## 2024-01-10 RX ADMIN — SODIUM CHLORIDE 100 MILLILITER(S): 9 INJECTION INTRAMUSCULAR; INTRAVENOUS; SUBCUTANEOUS at 16:52

## 2024-01-10 RX ADMIN — ONDANSETRON 4 MILLIGRAM(S): 8 TABLET, FILM COATED ORAL at 21:44

## 2024-01-10 RX ADMIN — Medication 400 MILLIGRAM(S): at 09:01

## 2024-01-10 NOTE — DISCHARGE NOTE PROVIDER - CARE PROVIDER_API CALL
Christiano Goddard  Orthopaedic Surgery  [address] [place] [address], [facility] [address]  [place] [address]  Phone: [phone]  Fax: [phone]  Follow Up Time:    Christiano Goddard  Orthopaedic Surgery  [address] [place] [address], [facility] [address]  [place] [address]  Phone: [phone]  Fax: [phone]  Follow Up Time:    Christiano Goddard  Orthopaedic Surgery  [address] [place] [address], [facility] [address]  [place] [address]  Phone: [phone]  Fax: [phone]  Follow Up Time:    Christiano Goddard  Orthopaedic Surgery  [address] [place] [address], [facility] [address]  [place] [address]  Phone: [phone]  Fax: [phone]  Follow Up Time:

## 2024-01-10 NOTE — H&P ADULT - NSHPPHYSICALEXAM_GEN_ALL_CORE
Physical Exam:  General: NAD, alert and oriented  Head: NCAT without abrasions, lacerations, or ecchymosis to head, face, or scalp    HIPS and PELVIS: Unable to SLR left Hip.     LEFT LE:   No open skin.   Externally Rotated and shortened.   No deformities or other signs of trauma at hip, knee, lower leg, ankle or foot.   Full baseline painless ROM at ankle and toes.  5/5 EHL/FHL/GS/TA   Positive log-roll and heel strike.   Sensation intact to light touch distally, DP 2+   Compartments are soft and compressible.     RIGHT LE:  No open skin.   No deformities or signs of trauma.  Full ROM at hip/knee/ankle  5/5 EHL/FHL/GS/TA   Negative log-roll and heel strike.   Sensation intact to light touch distally, DP 2+   Compartments are soft and compressible.     BL UE:   No open skin.   No obvious deformities or other signs of trauma at shoulder, upper arm, elbow, forearm, wrist or hand.    Full baseline painless ROM at shoulder, elbow, wrist, and . No bony TTP.   Compartments soft and compressible.

## 2024-01-10 NOTE — CONSULT NOTE ADULT - ASSESSMENT
78-year-old female with past medical history of hypertension, type 2 diabetes non-insulin-dependent presenting with  left hip pain and inability to ambulate after a fall.  Patient found to have hip fracture.  Medicine consulted for medicine pre-op       #Hip fracture    #HTN    #DM - type 2  78-year-old female with past medical history of hypertension, type 2 diabetes non-insulin-dependent presenting with  left hip pain and inability to ambulate after a fall.  Patient found to have hip fracture.  Medicine consulted for medicine pre-op     NOTE PENDING   #Hip fracture- Patient found to have  Intertrochanteric fracture of the left femur without   significant displacement on imaging plan for procedure   - Pain control per primary team     pt without history of increased ICP, aneurysm, active pulmonary disease, valvular disease, CAD, cerebral vascular disease.     Cardiovascular risk assessment:  Based on the RCRI, patient is class 1 **, with a **3.9%, 30 day risk of death, MI, or cardiac arrest. The patient is at low risk for cardiovascular complications from the low/moderate  risk procedure, __         Patient is ___medically optimized to proceed with procedure  without any further cardiac or pulmonary testing.       #HTN    #DM - type 2   - Hold oral anti-glycemics while in hospital  -Once eating can start ISS  78-year-old female with past medical history of hypertension, type 2 diabetes non-insulin-dependent presenting with  left hip pain and inability to ambulate after a fall.  Patient found to have hip fracture.  Medicine consulted for medicine pre-op     NOTE PENDING   #Hip fracture- Patient found to have  Intertrochanteric fracture of the left femur without significant displacement on imaging plan for procedure   - Pain control per primary team     pt without history of increased ICP, aneurysm, active pulmonary disease, valvular disease, CAD, cerebral vascular disease.     Cardiovascular risk assessment:  Based on the RCRI, patient is class 1 **, with a **3.9%, 30 day risk of death, MI, or cardiac arrest. The patient is at low risk for cardiovascular complications from the low/moderate  risk procedure, __         Patient is ___medically optimized to proceed with procedure  without any further cardiac or pulmonary testing.       #HTN  - Can resume home BP Med     #DM - type 2   - Hold oral anti-glycemics while in hospital  -Once eating can start ISS  78-year-old female with past medical history of hypertension, asthma, type 2 diabetes non-insulin-dependent presenting with  left hip pain and inability to ambulate after a fall.  Patient found to have hip fracture.  Medicine consulted for medicine pre-op     NOTE PENDING   #Hip fracture- Patient found to have  Intertrochanteric fracture of the left femur without significant displacement on imaging plan for procedure   - Pain control per primary team     pt without history of increased ICP, aneurysm, active pulmonary disease, valvular disease, CAD, cerebral vascular disease.     Cardiovascular risk assessment:  Based on the RCRI, patient is class 1 - 0 points, with a 3.9%, 30 day risk of death, MI, or cardiac arrest. The patient is at low risk for cardiovascular complications from the low/moderate  risk procedure.     Patient is medically optimized to proceed with procedure  without any further cardiac or pulmonary testing.     #Asthma - continue albuterol inhaler   #HTN  - Can resume home BP Med post procedure     #DM - type 2   - Hold oral anti-glycemics while in hospital  -Once eating can start ISS

## 2024-01-10 NOTE — PATIENT PROFILE ADULT - FALL HARM RISK - HARM RISK INTERVENTIONS
Assistance with ambulation/Assistance OOB with selected safe patient handling equipment/Communicate Risk of Fall with Harm to all staff/Discuss with provider need for PT consult/Monitor gait and stability/Provide patient with walking aids - walker, cane, crutches/Reinforce activity limits and safety measures with patient and family/Sit up slowly, dangle for a short time, stand at bedside before walking/Tailored Fall Risk Interventions/Use of alarms - bed, chair and/or voice tab/Visual Cue: Yellow wristband and red socks/Bed in lowest position, wheels locked, appropriate side rails in place/Call bell, personal items and telephone in reach/Instruct patient to call for assistance before getting out of bed or chair/Non-slip footwear when patient is out of bed/[place] to call system/Physically safe environment - no spills, clutter or unnecessary equipment/Purposeful Proactive Rounding/Room/bathroom lighting operational, light cord in reach Assistance with ambulation/Assistance OOB with selected safe patient handling equipment/Communicate Risk of Fall with Harm to all staff/Discuss with provider need for PT consult/Monitor gait and stability/Provide patient with walking aids - walker, cane, crutches/Reinforce activity limits and safety measures with patient and family/Sit up slowly, dangle for a short time, stand at bedside before walking/Tailored Fall Risk Interventions/Use of alarms - bed, chair and/or voice tab/Visual Cue: Yellow wristband and red socks/Bed in lowest position, wheels locked, appropriate side rails in place/Call bell, personal items and telephone in reach/Instruct patient to call for assistance before getting out of bed or chair/Non-slip footwear when patient is out of bed/[place] to call system/Physically safe environment - no spills, clutter or unnecessary equipment/Purposeful Proactive Rounding/Room/bathroom lighting operational, light cord in reach

## 2024-01-10 NOTE — DISCHARGE NOTE PROVIDER - NSDCMRMEDTOKEN_GEN_ALL_CORE_FT
famotidine 20 mg oral tablet: 1 tab(s) orally once a day  metFORMIN 1000 mg oral tablet: 1 tab(s) orally once a day  montelukast 10 mg oral tablet: 1 tab(s) orally once a day  valsartan 320 mg oral tablet: 1 tab(s) orally once a day   acetaminophen 325 mg oral tablet: 3 tab(s) orally every 8 hours  amLODIPine 5 mg oral tablet: 1 tab(s) orally once a day  enoxaparin: 40 milligram(s) subcutaneous once a day  famotidine 20 mg oral tablet: 1 tab(s) orally once a day  metFORMIN 1000 mg oral tablet: 1 tab(s) orally once a day  montelukast 10 mg oral tablet: 1 tab(s) orally once a day  oxyCODONE 5 mg oral tablet: 1 tab(s) orally every 4 hours As needed Moderate Pain (4 - 6)  pantoprazole 40 mg oral delayed release tablet: 1 tab(s) orally once a day (before a meal)  polyethylene glycol 3350 oral powder for reconstitution: 17 gram(s) orally once a day  senna leaf extract oral tablet: 2 tab(s) orally once a day (at bedtime)  valsartan 320 mg oral tablet: 1 tab(s) orally once a day

## 2024-01-10 NOTE — PATIENT PROFILE ADULT - FUNCTIONAL ASSESSMENT - BASIC MOBILITY 6.
3-calculated by average/Not able to assess (calculate score using [facility] averaging method)  3-calculated by average/Not able to assess (calculate score using [facility] averaging method)

## 2024-01-10 NOTE — CONSULT NOTE ADULT - TIME BILLING
Review of laboratory data, radiology results, consultants' recommendations, documentation in [place], discussion with patient/house staff/ACP and interdisciplinary staff (such as , social workers, etc). Interventions were performed as documented above. Review of laboratory data, radiology results, consultants' recommendations, documentation in [place], discussion with patient/house staff/ACP and interdisciplinary staff (such as , social workers, etc). Interventions were performed as documented above.

## 2024-01-10 NOTE — H&P ADULT - HISTORY OF PRESENT ILLNESS
Orthopedics H&P:  78y Gujarati Speaking Female with PMH of DM, asthma, HTN and PSH right TKA (Dr. Jiménez)  presents to [facility] with c/o L hip pain s/p mechanical fall. Patient was walking at home and fell onto her left side while turning. Patient has been unable to ambulated after the fall 2/2 pain. In the ED patient was found to have a left IT fx. Pt denies headstrike or LOC, numbness/tingling or any other orthopedic pain/injury. At baseline, pt ambulates with a cane. At the time of her fall she was ambulating without her cane.      PAST MEDICAL & SURGICAL HISTORY:  HTN (hypertension)  Asthma  No significant past surgical history    MEDICATIONS  (STANDING):  acetaminophen     Tablet .. 975 milliGRAM(s) Oral every 8 hours  chlorhexidine 2% Cloths 1 Application(s) Topical once  dextrose 5%. 1000 milliLiter(s) IV Continuous <Continuous>  dextrose 5%. 1000 milliLiter(s) IV Continuous <Continuous>  dextrose 50% Injectable 25 Gram(s) IV Push once  dextrose 50% Injectable 12.5 Gram(s) IV Push once  dextrose 50% Injectable 25 Gram(s) IV Push once  glucagon  Injectable 1 milliGRAM(s) IntraMuscular once  insulin lispro (ADMELOG) corrective regimen sliding scale   SubCutaneous at bedtime  insulin lispro (ADMELOG) corrective regimen sliding scale   SubCutaneous three times a day before meals  montelukast 10 milliGRAM(s) Oral daily  pantoprazole    Tablet 40 milliGRAM(s) Oral before breakfast  polyethylene glycol 3350 17 Gram(s) Oral daily  povidone iodine 5% Nasal Swab 1 Application(s) Both Nostrils once  senna 2 Tablet(s) Oral at bedtime  sodium chloride 0.9%. 1000 milliLiter(s) IV Continuous <Continuous>  valsartan 320 milliGRAM(s) Oral daily    Allergies  No Known Allergies  Intolerances    Vital Signs Last 24 Hrs  T(C): 37 (01-10-24 @ 08:03), Max: 37 (01-10-24 @ 08:03)  T(F): 98.6 (01-10-24 @ 08:03), Max: 98.6 (01-10-24 @ 08:03)  HR: 98 (01-10-24 @ 12:37) (94 - 98)  BP: 130/60 (01-10-24 @ 12:37) (121/76 - 130/60)  BP(mean): --  RR: 18 (01-10-24 @ 12:37) (18 - 18)  SpO2: 93% (01-10-24 @ 12:37) (93% - 99%)       Orthopedics H&P:  78y Gujarati Speaking Female with PMH of DM, asthma, HTN and PSH right TKA (Dr. Jiménez)  presents to [facility] with c/o L hip pain s/p mechanical fall. Patient was walking at home and fell onto her left side while turning. Patient has been unable to ambulated after the fall 2/2 pain. In the ED patient was found to have a left IT fx. Pt denies headstrike or LOC, numbness/tingling or any other orthopedic pain/injury. At baseline, pt ambulates with a cane. At the time of her fall she was ambulating without her cane.      PAST MEDICAL & SURGICAL HISTORY:  HTN (hypertension)  Asthma  No significant past surgical history    MEDICATIONS  (STANDING):  acetaminophen     Tablet .. 975 milliGRAM(s) Oral every 8 hours  chlorhexidine 2% Cloths 1 Application(s) Topical once  dextrose 5%. 1000 milliLiter(s) IV Continuous <Continuous>  dextrose 5%. 1000 milliLiter(s) IV Continuous <Continuous>  dextrose 50% Injectable 25 Gram(s) IV Push once  dextrose 50% Injectable 12.5 Gram(s) IV Push once  dextrose 50% Injectable 25 Gram(s) IV Push once  glucagon  Injectable 1 milliGRAM(s) IntraMuscular once  insulin lispro (ADMELOG) corrective regimen sliding scale   SubCutaneous at bedtime  insulin lispro (ADMELOG) corrective regimen sliding scale   SubCutaneous three times a day before meals  montelukast 10 milliGRAM(s) Oral daily  pantoprazole    Tablet 40 milliGRAM(s) Oral before breakfast  polyethylene glycol 3350 17 Gram(s) Oral daily  povidone iodine 5% Nasal Swab 1 Application(s) Both Nostrils once  senna 2 Tablet(s) Oral at bedtime  sodium chloride 0.9%. 1000 milliLiter(s) IV Continuous <Continuous>  valsartan 320 milliGRAM(s) Oral daily    Allergies  No Known Allergies  Intolerances    Vital Signs Last 24 Hrs  T(C): 37 (01-10-24 @ 08:03), Max: 37 (01-10-24 @ 08:03)  T(F): 98.6 (01-10-24 @ 08:03), Max: 98.6 (01-10-24 @ 08:03)  HR: 98 (01-10-24 @ 12:37) (94 - 98)  BP: 130/60 (01-10-24 @ 12:37) (121/76 - 130/60)  BP(mean): --  RR: 18 (01-10-24 @ 12:37) (18 - 18)  SpO2: 93% (01-10-24 @ 12:37) (93% - 99%)

## 2024-01-10 NOTE — ED PROVIDER NOTE - PHYSICAL EXAMINATION
Vital signs reviewed.   CONSTITUTIONAL: Well-appearing; well-nourished; in no apparent distress. Non-toxic appearing.   HEAD: Normocephalic, atraumatic.  EYES: PERRL, EOM intact, conjunctiva and sclera WNL.  ENT: normal nose; no rhinorrhea  CARD: Normal S1, S2; no murmurs, rubs, or gallops noted.  RESP: Normal chest excursion with respiration; breath sounds clear and equal bilaterally; no wheezes, rhonchi, or rales.  ABD/GI: soft, non-distended; non-tender; no palpable organomegaly, no pulsatile mass.  EXT/MS: moves RU, SARWAT, RL extremities without difficulty, no deformities or TTP of joints. LLE: TTP over left hip and lateral femur, without obvious deformity, no ecchymosis, no skin changes/ open wounds, Leg appears shortened and internally rotated, femoral pulse strong; distal pulses are normal, no pedal edema.  SKIN: Normal for age and race; warm; dry; good turgor; no apparent lesions or exudate noted.  NEURO: Awake, alert, oriented x 3, no gross deficits, CN II-XII grossly intact, no motor or sensory deficit noted.  PSYCH: Normal mood; appropriate affect.

## 2024-01-10 NOTE — ED PROVIDER NOTE - CLINICAL SUMMARY MEDICAL DECISION MAKING FREE TEXT BOX
78-year-old female with past medical history of hypertension, type 2 diabetes non-insulin-dependent presents to the ER complaining of left hip pain and inability to ambulate after a fall this morning at approximately 7:30 AM.  Patient was in the kitchen, turned and ended up falling onto her left hip, causing severe pain.  concern for hip fracture.   xray, labs, pain control

## 2024-01-10 NOTE — ED PROVIDER NOTE - OBJECTIVE STATEMENT
Patient granddaughter at bedside providing translation as per request    78-year-old female with past medical history of hypertension, type 2 diabetes non-insulin-dependent presents to the ER complaining of left hip pain and inability to ambulate after a fall this morning at approximately 7:30 AM.  Patient was in the kitchen, turned and ended up falling onto her left hip, causing severe pain.  EMS was called provided morphine and now which had made patient nauseous.  Patient denies chest pain, shortness of breath, lightheadedness, dizziness, abdominal pain, weakness, numbness, tingling, head strike neck pain, back pain, AC use, fevers, chills.  Patient has been in good health in recent days.

## 2024-01-10 NOTE — PATIENT PROFILE ADULT - VISION (WITH CORRECTIVE LENSES IF THE PATIENT USUALLY WEARS THEM):
Has reading glasses/Normal vision: sees adequately in most situations; can see medication labels, newsprint

## 2024-01-10 NOTE — DISCHARGE NOTE PROVIDER - PROVIDER TOKENS
PROVIDER:[TOKEN:[[id number]]] PROVIDER:[TOKEN:[[id number]]] PROVIDER:[TOKEN:[[id number]]] PROVIDER:[TOKEN:[[id number]]]

## 2024-01-10 NOTE — ED ADULT NURSE REASSESSMENT NOTE - NS ED NURSE REASSESS COMMENT FT1
Report received from RN. Pt hypoxic Report received from RN. Per day shift RN: Pt appears hypoxic (high 80s). Pt put on 4L NC. Airway is patent, respirations are lucia and unlabored. Plan of care ongoing, safety maintained. (covering nurse).

## 2024-01-10 NOTE — PATIENT PROFILE ADULT - COMPLETE THE FOLLOWING IF THE PATIENT REFUSES THE INFLUENZA VACCINE:
CC:  Liananasreen Davenport is here today for   Chief Complaint   Patient presents with   • Surgical Followup     1st PO / Carpal tunnel release right / DOS 7-30-21       PCP Elizabeth Packer MD   Medications: medications verified and updated  denies known Latex allergy or symptoms of Latex sensitivity.  Tobacco history: verified    Preferred pharmacy verified:  [facility] #[id number]   Risks/benefits discussed with patient/surrogate/Vaccine Information Sheet (VIS) provided-VIS date: 8/6/21

## 2024-01-10 NOTE — ASU PREOP CHECKLIST - ALLERGIES REVIEWED
done O-T Advancement Flap Text: The defect edges were debeveled with a #15 scalpel blade.  Given the location of the defect, shape of the defect and the proximity to free margins an O-T advancement flap was deemed most appropriate.  Using a sterile surgical marker, an appropriate advancement flap was drawn incorporating the defect and placing the expected incisions within the relaxed skin tension lines where possible.    The area thus outlined was incised deep to adipose tissue with a #15 scalpel blade.  The skin margins were undermined to an appropriate distance in all directions utilizing iris scissors.

## 2024-01-10 NOTE — H&P ADULT - ATTENDING COMMENTS
Patient seen and examined. EzLike  used. There were no Sensoristrati consent forms available for surgical consent, but EzLike  was used.. Wicho Prabhakar is a 78 year old female, past medical history of Diabetes, Hypertension, who presented to the [facility] Emergency Department following a fall at home. Patient was walking in the kitchen, when she turned and fell. Patient landed on her left hip. She was unable to ambulate and presented to the [facility] Emergency Department. XRays showed a Left Intertrochanteric Hip Fracture. Patient was indicated for a Left Hip Intramedullary Nail. Patient was cleared by Medicine.      Physical Exam:  Skin intact, no erythema  Compartments soft, nondistended, non tender  Motor: Intact EHL/FHL/Tibialis Anterior/Gastrocnemius  Sensory: Intact Superficial Peroneal/Deep Peroneal/Saphenous/Sural/Tibial Nerves  Vascular: 2+ DP Pulse  No tenderness or pain with ROM over the bilateral upper extremities  No tenderness or pain with ROM over the right lower extremity  No tenderness over the left tibia. Pain with left hip motion.    Assessment/Plan:  Wicho Prabhakar is a 78 year old female who presented to [facility] with a Left Intertrochanteric Hip Fracture. Discussed the operative and nonoperative management at length with the patient. Nonoperative management would consist of bedrest. Discussed the risks of nonoperative management including, but not limited to, continued pain, pressure ulcers, pneumonia, urinary tract infection, and death. Operative management would consist of Left hip open reduction, internal fixation and placement of intramedullary nail. The risks, benefits and alternatives to intramedullary nail were discussed with the patient in detail and the patient elected to proceed with surgery. Discussed the surgical plan with the patient including implant options and surgical approach. Discussed the recovery from intramedullary nail, including inpatient hospital stay, rehabilitation center placement, postoperative ambulation, and DVT prophylaxis. Discussed the risks of surgery including, but not limited to, blood loss, injuries to nerves and vessels, heart attack, stroke, death, organ failure, nonunion, malunion, hardware failure, screw cut out, need for further surgery, need for future total hip arthroplasty, avascular necrosis/osteonecrosis, failure to achieve desired results, continued pain, VTE, decreased ambulation, and infection. Following discussion, patient elected to proceed with Left hip open reduction, internal fixation, placement of intramedullary nail. Informed consent was obtained with patient. Patient was understanding and in agreement with the operative plan. All questions were answered.    Plan:   -Left hip open reduction, internal fixation and placement of intramedullary nail.  -Clearance in Chart Patient seen and examined. CoinPass  used. There were no Enerpulserati consent forms available for surgical consent, but CoinPass  was used.. Wicho Prabhakar is a 78 year old female, past medical history of Diabetes, Hypertension, who presented to the [facility] Emergency Department following a fall at home. Patient was walking in the kitchen, when she turned and fell. Patient landed on her left hip. She was unable to ambulate and presented to the [facility] Emergency Department. XRays showed a Left Intertrochanteric Hip Fracture. Patient was indicated for a Left Hip Intramedullary Nail. Patient was cleared by Medicine.      Physical Exam:  Skin intact, no erythema  Compartments soft, nondistended, non tender  Motor: Intact EHL/FHL/Tibialis Anterior/Gastrocnemius  Sensory: Intact Superficial Peroneal/Deep Peroneal/Saphenous/Sural/Tibial Nerves  Vascular: 2+ DP Pulse  No tenderness or pain with ROM over the bilateral upper extremities  No tenderness or pain with ROM over the right lower extremity  No tenderness over the left tibia. Pain with left hip motion.    Assessment/Plan:  Wicho Prabhakar is a 78 year old female who presented to [facility] with a Left Intertrochanteric Hip Fracture. Discussed the operative and nonoperative management at length with the patient. Nonoperative management would consist of bedrest. Discussed the risks of nonoperative management including, but not limited to, continued pain, pressure ulcers, pneumonia, urinary tract infection, and death. Operative management would consist of Left hip open reduction, internal fixation and placement of intramedullary nail. The risks, benefits and alternatives to intramedullary nail were discussed with the patient in detail and the patient elected to proceed with surgery. Discussed the surgical plan with the patient including implant options and surgical approach. Discussed the recovery from intramedullary nail, including inpatient hospital stay, rehabilitation center placement, postoperative ambulation, and DVT prophylaxis. Discussed the risks of surgery including, but not limited to, blood loss, injuries to nerves and vessels, heart attack, stroke, death, organ failure, nonunion, malunion, hardware failure, screw cut out, need for further surgery, need for future total hip arthroplasty, avascular necrosis/osteonecrosis, failure to achieve desired results, continued pain, VTE, decreased ambulation, and infection. Following discussion, patient elected to proceed with Left hip open reduction, internal fixation, placement of intramedullary nail. Informed consent was obtained with patient. Patient was understanding and in agreement with the operative plan. All questions were answered.    Plan:   -Left hip open reduction, internal fixation and placement of intramedullary nail.  -Clearance in Chart

## 2024-01-10 NOTE — ASU PREOP CHECKLIST - PATIENT PROBLEMS/NEEDS
plan of care explained plan of care explained/po fluids offered/meal provided Patient expressed no known problems or needs

## 2024-01-10 NOTE — CONSULT NOTE ADULT - SUBJECTIVE AND OBJECTIVE BOX
Chief Complaint:    HPI:      PAST MEDICAL & SURGICAL HISTORY:  HTN (hypertension)      Asthma      No significant past surgical history          Review of Systems:   CONSTITUTIONAL: No fever.  EYES: No eye pain or discharge.  ENMT:  No sinus or throat pain  NECK: No pain or stiffness  RESPIRATORY: No cough, wheezing, chills or hemoptysis; No shortness of breath  CARDIOVASCULAR: No chest pain, palpitations, dizziness, or leg swelling  GASTROINTESTINAL: No abdominal or epigastric pain. No nausea, vomiting, or hematemesis; No diarrhea or constipation. No melena or hematochezia.  GENITOURINARY: No dysuria or incontinence  NEUROLOGICAL: No headaches, memory loss, loss of strength, numbness, or tremors  SKIN: No rashes.  LYMPH NODES: No enlarged glands  ENDOCRINE: No heat or cold intolerance; No hair loss  MUSCULOSKELETAL: No joint pain or swelling; No muscle, back, or extremity pain  PSYCHIATRIC: No depression, anxiety, mood swings, or difficulty sleeping  HEME/LYMPH: No easy bruising, or bleeding gums  ALLERY AND IMMUNOLOGIC: No hives or eczema    Allergies    No Known Allergies    Intolerances        Social History:     FAMILY HISTORY:  No pertinent family history in first degree relatives        Home Medications:      MEDICATIONS  (STANDING):  chlorhexidine 2% Cloths 1 Application(s) Topical once  povidone iodine 5% Nasal Swab 1 Application(s) Both Nostrils once  sodium chloride 0.9%. 1000 milliLiter(s) (100 mL/Hr) IV Continuous <Continuous>    MEDICATIONS  (PRN):      CAPILLARY BLOOD GLUCOSE      POCT Blood Glucose.: 158 mg/dL (10 Jose E 2024 08:04)    I&O's Summary      PHYSICAL EXAM:  Vital Signs Last 24 Hrs  T(C): 37 (10 Jose E 2024 08:03), Max: 37 (10 Jose E 2024 08:03)  T(F): 98.6 (10 Jose E 2024 08:03), Max: 98.6 (10 Jose E 2024 08:03)  HR: 98 (10 Jose E 2024 12:37) (94 - 98)  BP: 130/60 (10 Jose E 2024 12:37) (121/76 - 130/60)  BP(mean): --  RR: 18 (10 Jose E 2024 12:37) (18 - 18)  SpO2: 93% (10 Jose E 2024 12:37) (93% - 99%)    Parameters below as of 10 Jose E 2024 12:37  Patient On (Oxygen Delivery Method): room air      GENERAL: NAD, well-developed  HEAD:  Atraumatic, Normocephalic  EYES: EOMI, PERRLA, conjunctiva and sclera clear  NECK: Supple, No JVD  CHEST/LUNG: Clear to auscultation bilaterally; No wheeze  HEART: Regular rate and rhythm; No murmurs, rubs, or gallops  ABDOMEN: Soft, Nontender, Nondistended; Bowel sounds present  EXTREMITIES:  2+ Peripheral Pulses, No clubbing, cyanosis, or edema  PSYCH: AAOx3  NEUROLOGY: non-focal  SKIN: No rashes or lesions    LABS:                        11.6   13.55 )-----------( 296      ( 10 Jose E 2024 09:00 )             36.3     01-10    137  |  102  |  12  ----------------------------<  160<H>  4.0   |  25  |  0.61    Ca    8.9      10 Jose E 2024 09:00    TPro  6.8  /  Alb  4.0  /  TBili  0.3  /  DBili  x   /  AST  14  /  ALT  13  /  AlkPhos  78  01-10    PT/INR - ( 10 Jose E 2024 09:00 )   PT: 11.1 sec;   INR: 0.99 ratio         PTT - ( 10 Jose E 2024 09:00 )  PTT:28.0 sec      Urinalysis Basic - ( 10 Jose E 2024 09:00 )    Color: x / Appearance: x / SG: x / pH: x  Gluc: 160 mg/dL / Ketone: x  / Bili: x / Urobili: x   Blood: x / Protein: x / Nitrite: x   Leuk Esterase: x / RBC: x / WBC x   Sq Epi: x / Non Sq Epi: x / Bacteria: x        RADIOLOGY & ADDITIONAL TESTS:    Imaging Personally Reviewed:    EKG Personally Reviewed:    Consultant(s) Notes Reviewed:      Care Discussed with Consultants/Other Providers:   Chief Complaint:    HPI:      PAST MEDICAL & SURGICAL HISTORY:  HTN (hypertension)      Asthma      No significant past surgical history          Review of Systems:   CONSTITUTIONAL: No fever.  EYES: No eye pain or discharge.  ENMT:  No sinus or throat pain  NECK: No pain or stiffness  RESPIRATORY: No cough, wheezing, chills or hemoptysis; No shortness of breath  CARDIOVASCULAR: No chest pain, palpitations, dizziness, or leg swelling  GASTROINTESTINAL: No abdominal or epigastric pain. No nausea, vomiting, or hematemesis; No diarrhea or constipation. No melena or hematochezia.  GENITOURINARY: No dysuria or incontinence  NEUROLOGICAL: No headaches, memory loss, loss of strength, numbness, or tremors  SKIN: No rashes.  LYMPH NODES: No enlarged glands  ENDOCRINE: No heat or cold intolerance; No hair loss  MUSCULOSKELETAL: No joint pain or swelling; No muscle, back, or extremity pain  PSYCHIATRIC: No depression, anxiety, mood swings, or difficulty sleeping  HEME/LYMPH: No easy bruising, or bleeding gums  ALLERY AND IMMUNOLOGIC: No hives or eczema    Allergies    No Known Allergies    Intolerances        Social History:     FAMILY HISTORY:  No pertinent family history in first degree relatives        Home Medications:      MEDICATIONS  (STANDING):  chlorhexidine 2% Cloths 1 Application(s) Topical once  povidone iodine 5% Nasal Swab 1 Application(s) Both Nostrils once  sodium chloride 0.9%. 1000 milliLiter(s) (100 mL/Hr) IV Continuous <Continuous>    MEDICATIONS  (PRN):      CAPILLARY BLOOD GLUCOSE      POCT Blood Glucose.: 158 mg/dL (10 Jose E 2024 08:04)    I&O's Summary      PHYSICAL EXAM:  Vital Signs Last 24 Hrs  T(C): 37 (10 Jose E 2024 08:03), Max: 37 (10 Jose E 2024 08:03)  T(F): 98.6 (10 Jose E 2024 08:03), Max: 98.6 (10 Jose E 2024 08:03)  HR: 98 (10 Jose E 2024 12:37) (94 - 98)  BP: 130/60 (10 Jose E 2024 12:37) (121/76 - 130/60)  BP(mean): --  RR: 18 (10 Jose E 2024 12:37) (18 - 18)  SpO2: 93% (10 Jose E 2024 12:37) (93% - 99%)    Parameters below as of 10 Jose E 2024 12:37  Patient On (Oxygen Delivery Method): room air      GENERAL: NAD, well-developed  HEAD:  Atraumatic, Normocephalic  EYES: EOMI, PERRLA, conjunctiva and sclera clear  NECK: Supple, No JVD  CHEST/LUNG: Clear to auscultation bilaterally; No wheeze  HEART: Regular rate and rhythm; No murmurs, rubs, or gallops  ABDOMEN: Soft, Nontender, Nondistended; Bowel sounds present  EXTREMITIES:  2+ Peripheral Pulses, No clubbing, cyanosis, or edema  PSYCH: AAOx3  NEUROLOGY: non-focal  SKIN: No rashes or lesions    LABS:                        11.6   13.55 )-----------( 296      ( 10 Jose E 2024 09:00 )             36.3     01-10    137  |  102  |  12  ----------------------------<  160<H>  4.0   |  25  |  0.61    Ca    8.9      10 Jose E 2024 09:00    TPro  6.8  /  Alb  4.0  /  TBili  0.3  /  DBili  x   /  AST  14  /  ALT  13  /  AlkPhos  78  01-10    PT/INR - ( 10 Jose E 2024 09:00 )   PT: 11.1 sec;   INR: 0.99 ratio         PTT - ( 10 Jose E 2024 09:00 )  PTT:28.0 sec      Urinalysis Basic - ( 10 Jose E 2024 09:00 )    Color: x / Appearance: x / SG: x / pH: x  Gluc: 160 mg/dL / Ketone: x  / Bili: x / Urobili: x   Blood: x / Protein: x / Nitrite: x   Leuk Esterase: x / RBC: x / WBC x   Sq Epi: x / Non Sq Epi: x / Bacteria: x        RADIOLOGY & ADDITIONAL TESTS:    Imaging Personally Reviewed:< from: Xray Hip w/ Pelvis 2 or 3 Views, Left (01.10.24 @ 11:06) >  IMPRESSION: Intertrochanteric fracture of the left femur without   significant displacement. Bilateral femoral heads are well situated   within the acetabulum. Moderate degenerative changes of the lumbar spine.    < end of copied text >  < from: Xray Chest 1 View AP/PA (01.10.24 @ 11:05) >    IMPRESSION:  No acute traumatic findings.  Increased vascularity more on the right lung with faint nodularity   overlying the periphery of the right upper lobe.    < end of copied text >      EKG Personally Reviewed:  Normal sinus rhythm no signs of ischemia   Consultant(s) Notes Reviewed:      Care Discussed with Consultants/Other Providers:   Chief Complaint:    HPI:      PAST MEDICAL & SURGICAL HISTORY:  HTN (hypertension)      Asthma      No significant past surgical history          Review of Systems:   CONSTITUTIONAL: No fever.  EYES: No eye pain or discharge.  ENMT:  No sinus or throat pain  NECK: No pain or stiffness  RESPIRATORY: No cough, wheezing, chills or hemoptysis; No shortness of breath  CARDIOVASCULAR: No chest pain, palpitations, dizziness, or leg swelling  GASTROINTESTINAL: No abdominal or epigastric pain. No nausea, vomiting, or hematemesis; No diarrhea or constipation. No melena or hematochezia.  GENITOURINARY: No dysuria or incontinence  NEUROLOGICAL: No headaches, memory loss, loss of strength, numbness, or tremors  SKIN: No rashes.  LYMPH NODES: No enlarged glands  ENDOCRINE: No heat or cold intolerance; No hair loss  MUSCULOSKELETAL: No joint pain or swelling; No muscle, back, or extremity pain  PSYCHIATRIC: No depression, anxiety, mood swings, or difficulty sleeping  HEME/LYMPH: No easy bruising, or bleeding gums  ALLERY AND IMMUNOLOGIC: No hives or eczema    Allergies    No Known Allergies    Intolerances        Social History:     FAMILY HISTORY:  No pertinent family history in first degree relatives        Home Medications:      MEDICATIONS  (STANDING):  chlorhexidine 2% Cloths 1 Application(s) Topical once  povidone iodine 5% Nasal Swab 1 Application(s) Both Nostrils once  sodium chloride 0.9%. 1000 milliLiter(s) (100 mL/Hr) IV Continuous <Continuous>    MEDICATIONS  (PRN):      CAPILLARY BLOOD GLUCOSE      POCT Blood Glucose.: 158 mg/dL (10 Jose E 2024 08:04)    I&O's Summary      PHYSICAL EXAM:  Vital Signs Last 24 Hrs  T(C): 37 (10 Jose E 2024 08:03), Max: 37 (10 Jose E 2024 08:03)  T(F): 98.6 (10 Joes E 2024 08:03), Max: 98.6 (10 Jose E 2024 08:03)  HR: 98 (10 Jose E 2024 12:37) (94 - 98)  BP: 130/60 (10 Jose E 2024 12:37) (121/76 - 130/60)  BP(mean): --  RR: 18 (10 Jose E 2024 12:37) (18 - 18)  SpO2: 93% (10 Jose E 2024 12:37) (93% - 99%)    Parameters below as of 10 Jose E 2024 12:37  Patient On (Oxygen Delivery Method): room air      GENERAL: NAD, well-developed  HEAD:  Atraumatic, Normocephalic  EYES: EOMI, PERRLA, conjunctiva and sclera clear  NECK: Supple, No JVD  CHEST/LUNG: Clear to auscultation bilaterally; No wheeze  HEART: Regular rate and rhythm; No murmurs, rubs, or gallops  ABDOMEN: Soft, Nontender, Nondistended; Bowel sounds present  EXTREMITIES:  2+ Peripheral Pulses, No clubbing, cyanosis, or edema  PSYCH: AAOx3  NEUROLOGY: non-focal  SKIN: No rashes or lesions    LABS:                        11.6   13.55 )-----------( 296      ( 10 Jose E 2024 09:00 )             36.3     01-10    137  |  102  |  12  ----------------------------<  160<H>  4.0   |  25  |  0.61    Ca    8.9      10 Jose E 2024 09:00    TPro  6.8  /  Alb  4.0  /  TBili  0.3  /  DBili  x   /  AST  14  /  ALT  13  /  AlkPhos  78  01-10    PT/INR - ( 10 Jose E 2024 09:00 )   PT: 11.1 sec;   INR: 0.99 ratio         PTT - ( 10 Jose E 2024 09:00 )  PTT:28.0 sec      Urinalysis Basic - ( 10 Jose E 2024 09:00 )    Color: x / Appearance: x / SG: x / pH: x  Gluc: 160 mg/dL / Ketone: x  / Bili: x / Urobili: x   Blood: x / Protein: x / Nitrite: x   Leuk Esterase: x / RBC: x / WBC x   Sq Epi: x / Non Sq Epi: x / Bacteria: x        RADIOLOGY & ADDITIONAL TESTS:    Imaging Personally Reviewed:< from: Xray Hip w/ Pelvis 2 or 3 Views, Left (01.10.24 @ 11:06) >  IMPRESSION: Intertrochanteric fracture of the left femur without   significant displacement. Bilateral femoral heads are well situated   within the acetabulum. Moderate degenerative changes of the lumbar spine.    < end of copied text >  < from: Xray Chest 1 View AP/PA (01.10.24 @ 11:05) >    IMPRESSION:  No acute traumatic findings.  Increased vascularity more on the right lung with faint nodularity   overlying the periphery of the right upper lobe.    < end of copied text >      EKG Personally Reviewed:  Normal sinus rhythm no signs of ischemia   Consultant(s) Notes Reviewed:      Care Discussed with Consultants/Other Providers:   Chief Complaint:    HPI: 78-year-old female with past medical history of hypertension, type 2 diabetes non-insulin-dependent presenting with  left hip pain and inability to ambulate after a fall.  Patient found to have hip fracture.  Medicine consulted for medicine pre-op;.Patient examined at bedside with Sharlene  with family at bedside. Denies. Chest pain or SOB. Able to perform 4 METS.       PAST MEDICAL & SURGICAL HISTORY:  HTN (hypertension)      Asthma      No significant past surgical history          Review of Systems:   CONSTITUTIONAL: No fever.  EYES: No eye pain or discharge.  ENMT:  No sinus or throat pain  NECK: No pain or stiffness  RESPIRATORY: No cough, wheezing, chills or hemoptysis; No shortness of breath  CARDIOVASCULAR: No chest pain, palpitations, dizziness, or leg swelling  GASTROINTESTINAL: No abdominal or epigastric pain. No nausea, vomiting, or hematemesis; No diarrhea or constipation. No melena or hematochezia.  GENITOURINARY: No dysuria or incontinence  NEUROLOGICAL: No headaches, memory loss, loss of strength, numbness, or tremors  SKIN: No rashes.  LYMPH NODES: No enlarged glands  ENDOCRINE: No heat or cold intolerance; No hair loss  MUSCULOSKELETAL: No joint pain or swelling; No muscle, back, or extremity pain  PSYCHIATRIC: No depression, anxiety, mood swings, or difficulty sleeping  HEME/LYMPH: No easy bruising, or bleeding gums  ALLERY AND IMMUNOLOGIC: No hives or eczema    Allergies    No Known Allergies    Intolerances        Social History:     FAMILY HISTORY:  No pertinent family history in first degree relatives        Home Medications: [id number], valsartan 320,Famotidine,Meloxacam       MEDICATIONS  (STANDING):  chlorhexidine 2% Cloths 1 Application(s) Topical once  povidone iodine 5% Nasal Swab 1 Application(s) Both Nostrils once  sodium chloride 0.9%. 1000 milliLiter(s) (100 mL/Hr) IV Continuous <Continuous>    MEDICATIONS  (PRN):      CAPILLARY BLOOD GLUCOSE      POCT Blood Glucose.: 158 mg/dL (10 Jose E 2024 08:04)    I&O's Summary      PHYSICAL EXAM:  Vital Signs Last 24 Hrs  T(C): 37 (10 Jose E 2024 08:03), Max: 37 (10 Jose E 2024 08:03)  T(F): 98.6 (10 Jose E 2024 08:03), Max: 98.6 (10 Jose E 2024 08:03)  HR: 98 (10 Jose E 2024 12:37) (94 - 98)  BP: 130/60 (10 Jose E 2024 12:37) (121/76 - 130/60)  BP(mean): --  RR: 18 (10 Jose E 2024 12:37) (18 - 18)  SpO2: 93% (10 Jose E 2024 12:37) (93% - 99%)    Parameters below as of 10 Jose E 2024 12:37  Patient On (Oxygen Delivery Method): room air      GENERAL: Elderly woman in NAD, well-developed  HEAD:  Atraumatic, Normocephalic  EYES: EOMI, PERRLA, conjunctiva and sclera clear  NECK: Supple, No JVD  CHEST/LUNG: Clear to auscultation bilaterally; No wheeze  HEART: Regular rate and rhythm; No murmurs, rubs, or gallops  ABDOMEN: Soft, Nontender, Nondistended; Bowel sounds present  EXTREMITIES:  2+ Peripheral Pulses, No clubbing, cyanosis, or edema  PSYCH: AAOx3  NEUROLOGY: non-focal  SKIN: No rashes or lesions    LABS:                        11.6   13.55 )-----------( 296      ( 10 Jose E 2024 09:00 )             36.3     01-10    137  |  102  |  12  ----------------------------<  160<H>  4.0   |  25  |  0.61    Ca    8.9      10 Jose E 2024 09:00    TPro  6.8  /  Alb  4.0  /  TBili  0.3  /  DBili  x   /  AST  14  /  ALT  13  /  AlkPhos  78  01-10    PT/INR - ( 10 Jose E 2024 09:00 )   PT: 11.1 sec;   INR: 0.99 ratio         PTT - ( 10 Jose E 2024 09:00 )  PTT:28.0 sec      Urinalysis Basic - ( 10 Jose E 2024 09:00 )    Color: x / Appearance: x / SG: x / pH: x  Gluc: 160 mg/dL / Ketone: x  / Bili: x / Urobili: x   Blood: x / Protein: x / Nitrite: x   Leuk Esterase: x / RBC: x / WBC x   Sq Epi: x / Non Sq Epi: x / Bacteria: x        RADIOLOGY & ADDITIONAL TESTS:    Imaging Personally Reviewed:< from: Xray Hip w/ Pelvis 2 or 3 Views, Left (01.10.24 @ 11:06) >  IMPRESSION: Intertrochanteric fracture of the left femur without   significant displacement. Bilateral femoral heads are well situated   within the acetabulum. Moderate degenerative changes of the lumbar spine.    < end of copied text >  < from: Xray Chest 1 View AP/PA (01.10.24 @ 11:05) >    IMPRESSION:  No acute traumatic findings.  Increased vascularity more on the right lung with faint nodularity   overlying the periphery of the right upper lobe.    < end of copied text >      EKG Personally Reviewed:  Normal sinus rhythm no signs of ischemia   Consultant(s) Notes Reviewed:      Care Discussed with Consultants/Other Providers:   Chief Complaint:    HPI: 78-year-old female with past medical history of hypertension, type 2 diabetes non-insulin-dependent presenting with  left hip pain and inability to ambulate after a fall.  Patient found to have hip fracture.  Medicine consulted for medicine pre-op;.Patient examined at bedside with Sharlene  with family at bedside. Denies. Chest pain or SOB. Able to perform 4 METS.       PAST MEDICAL & SURGICAL HISTORY:  HTN (hypertension)      Asthma      No significant past surgical history          Review of Systems:   CONSTITUTIONAL: No fever.  EYES: No eye pain or discharge.  ENMT:  No sinus or throat pain  NECK: No pain or stiffness  RESPIRATORY: No cough, wheezing, chills or hemoptysis; No shortness of breath  CARDIOVASCULAR: No chest pain, palpitations, dizziness, or leg swelling  GASTROINTESTINAL: No abdominal or epigastric pain. No nausea, vomiting, or hematemesis; No diarrhea or constipation. No melena or hematochezia.  GENITOURINARY: No dysuria or incontinence  NEUROLOGICAL: No headaches, memory loss, loss of strength, numbness, or tremors  SKIN: No rashes.  LYMPH NODES: No enlarged glands  ENDOCRINE: No heat or cold intolerance; No hair loss  MUSCULOSKELETAL: No joint pain or swelling; No muscle, back, or extremity pain  PSYCHIATRIC: No depression, anxiety, mood swings, or difficulty sleeping  HEME/LYMPH: No easy bruising, or bleeding gums  ALLERY AND IMMUNOLOGIC: No hives or eczema    Allergies    No Known Allergies    Intolerances        Social History:     FAMILY HISTORY:  No pertinent family history in first degree relatives        Home Medications: [id number], valsartan 320,Famotidine,Meloxacam       MEDICATIONS  (STANDING):  chlorhexidine 2% Cloths 1 Application(s) Topical once  povidone iodine 5% Nasal Swab 1 Application(s) Both Nostrils once  sodium chloride 0.9%. 1000 milliLiter(s) (100 mL/Hr) IV Continuous <Continuous>    MEDICATIONS  (PRN):      CAPILLARY BLOOD GLUCOSE      POCT Blood Glucose.: 158 mg/dL (10 Jose E 2024 08:04)    I&O's Summary      PHYSICAL EXAM:  Vital Signs Last 24 Hrs  T(C): 37 (10 Jose E 2024 08:03), Max: 37 (10 Jose E 2024 08:03)  T(F): 98.6 (10 Jose E 2024 08:03), Max: 98.6 (10 Jose E 2024 08:03)  HR: 98 (10 Jose E 2024 12:37) (94 - 98)  BP: 130/60 (10 Jose E 2024 12:37) (121/76 - 130/60)  BP(mean): --  RR: 18 (10 Jose E 2024 12:37) (18 - 18)  SpO2: 93% (10 Jose E 2024 12:37) (93% - 99%)    Parameters below as of 10 Jose E 2024 12:37  Patient On (Oxygen Delivery Method): room air      GENERAL: Elderly woman in NAD, well-developed  HEAD:  Atraumatic, Normocephalic  EYES: EOMI, PERRLA, conjunctiva and sclera clear  NECK: Supple, No JVD  CHEST/LUNG: Clear to auscultation bilaterally; No wheeze  HEART: Regular rate and rhythm; No murmurs, rubs, or gallops  ABDOMEN: Soft, Nontender, Nondistended; Bowel sounds present  EXTREMITIES:  2+ Peripheral Pulses, No clubbing, cyanosis, or edema  PSYCH: AAOx3  NEUROLOGY: non-focal  SKIN: No rashes or lesions    LABS:                        11.6   13.55 )-----------( 296      ( 10 Jose E 2024 09:00 )             36.3     01-10    137  |  102  |  12  ----------------------------<  160<H>  4.0   |  25  |  0.61    Ca    8.9      10 Jose E 2024 09:00    TPro  6.8  /  Alb  4.0  /  TBili  0.3  /  DBili  x   /  AST  14  /  ALT  13  /  AlkPhos  78  01-10    PT/INR - ( 10 Jose E 2024 09:00 )   PT: 11.1 sec;   INR: 0.99 ratio         PTT - ( 10 Jose E 2024 09:00 )  PTT:28.0 sec      Urinalysis Basic - ( 10 Jose E 2024 09:00 )    Color: x / Appearance: x / SG: x / pH: x  Gluc: 160 mg/dL / Ketone: x  / Bili: x / Urobili: x   Blood: x / Protein: x / Nitrite: x   Leuk Esterase: x / RBC: x / WBC x   Sq Epi: x / Non Sq Epi: x / Bacteria: x        RADIOLOGY & ADDITIONAL TESTS:    Imaging Personally Reviewed:< from: Xray Hip w/ Pelvis 2 or 3 Views, Left (01.10.24 @ 11:06) >  IMPRESSION: Intertrochanteric fracture of the left femur without   significant displacement. Bilateral femoral heads are well situated   within the acetabulum. Moderate degenerative changes of the lumbar spine.    < end of copied text >  < from: Xray Chest 1 View AP/PA (01.10.24 @ 11:05) >    IMPRESSION:  No acute traumatic findings.  Increased vascularity more on the right lung with faint nodularity   overlying the periphery of the right upper lobe.    < end of copied text >      EKG Personally Reviewed:  Normal sinus rhythm no signs of ischemia   Consultant(s) Notes Reviewed:      Care Discussed with Consultants/Other Providers:   Chief Complaint:    HPI: 78-year-old female with past medical history of hypertension, asthma, type 2 diabetes non-insulin-dependent presenting with  left hip pain and inability to ambulate after a fall.  Patient found to have hip fracture.  Medicine consulted for medicine pre-op;.Patient examined at bedside with Sharlene  with family at bedside. Denies. Chest pain or SOB. Able to perform 4 METS - reports being able to do house work without sob. No history of heart attack and stroke. Had a prior surgery. No issues with anesthesia.       PAST MEDICAL & SURGICAL HISTORY:  HTN (hypertension)      Asthma      No significant past surgical history          Review of Systems:   CONSTITUTIONAL: No fever.  EYES: No eye pain or discharge.  ENMT:  No sinus or throat pain  NECK: No pain or stiffness  RESPIRATORY: No cough, wheezing, chills or hemoptysis; No shortness of breath  CARDIOVASCULAR: No chest pain, palpitations, dizziness, or leg swelling  GASTROINTESTINAL: No abdominal or epigastric pain. No nausea, vomiting, or hematemesis; No diarrhea or constipation. No melena or hematochezia.  GENITOURINARY: No dysuria or incontinence  NEUROLOGICAL: No headaches, memory loss, loss of strength, numbness, or tremors  SKIN: No rashes.  LYMPH NODES: No enlarged glands  ENDOCRINE: No heat or cold intolerance; No hair loss  MUSCULOSKELETAL: No joint pain or swelling; No muscle, back, or extremity pain  PSYCHIATRIC: No depression, anxiety, mood swings, or difficulty sleeping  HEME/LYMPH: No easy bruising, or bleeding gums  ALLERY AND IMMUNOLOGIC: No hives or eczema    Allergies    No Known Allergies    Intolerances        Social History:  Denies toxic habits     FAMILY HISTORY:  No pertinent family history in first degree relatives        Home Medications: [id number], valsartan 320,Famotidine,Meloxacam       MEDICATIONS  (STANDING):  chlorhexidine 2% Cloths 1 Application(s) Topical once  povidone iodine 5% Nasal Swab 1 Application(s) Both Nostrils once  sodium chloride 0.9%. 1000 milliLiter(s) (100 mL/Hr) IV Continuous <Continuous>    MEDICATIONS  (PRN):      CAPILLARY BLOOD GLUCOSE      POCT Blood Glucose.: 158 mg/dL (10 Jose E 2024 08:04)    I&O's Summary      PHYSICAL EXAM:  Vital Signs Last 24 Hrs  T(C): 37 (10 Jose E 2024 08:03), Max: 37 (10 Jose E 2024 08:03)  T(F): 98.6 (10 Jose E 2024 08:03), Max: 98.6 (10 Jose E 2024 08:03)  HR: 98 (10 Jose E 2024 12:37) (94 - 98)  BP: 130/60 (10 Jose E 2024 12:37) (121/76 - 130/60)  BP(mean): --  RR: 18 (10 Jose E 2024 12:37) (18 - 18)  SpO2: 93% (10 Jose E 2024 12:37) (93% - 99%)    Parameters below as of 10 Jose E 2024 12:37  Patient On (Oxygen Delivery Method): room air      GENERAL: Elderly woman in NAD, well-developed  HEAD:  Atraumatic, Normocephalic  EYES: EOMI, PERRLA, conjunctiva and sclera clear  NECK: Supple, No JVD  CHEST/LUNG: Clear to auscultation bilaterally; No wheeze  HEART: Regular rate and rhythm; No murmurs, rubs, or gallops  ABDOMEN: Soft, Nontender, Nondistended; Bowel sounds present  EXTREMITIES:  2+ Peripheral Pulses, No clubbing, cyanosis, or edema  PSYCH: AAOx3  NEUROLOGY: non-focal  SKIN: No rashes or lesions    LABS:                        11.6   13.55 )-----------( 296      ( 10 Jose E 2024 09:00 )             36.3     01-10    137  |  102  |  12  ----------------------------<  160<H>  4.0   |  25  |  0.61    Ca    8.9      10 Jose E 2024 09:00    TPro  6.8  /  Alb  4.0  /  TBili  0.3  /  DBili  x   /  AST  14  /  ALT  13  /  AlkPhos  78  01-10    PT/INR - ( 10 Jose E 2024 09:00 )   PT: 11.1 sec;   INR: 0.99 ratio         PTT - ( 10 Jose E 2024 09:00 )  PTT:28.0 sec      Urinalysis Basic - ( 10 Jose E 2024 09:00 )    Color: x / Appearance: x / SG: x / pH: x  Gluc: 160 mg/dL / Ketone: x  / Bili: x / Urobili: x   Blood: x / Protein: x / Nitrite: x   Leuk Esterase: x / RBC: x / WBC x   Sq Epi: x / Non Sq Epi: x / Bacteria: x        RADIOLOGY & ADDITIONAL TESTS:    Imaging Personally Reviewed:< from: Xray Hip w/ Pelvis 2 or 3 Views, Left (01.10.24 @ 11:06) >  IMPRESSION: Intertrochanteric fracture of the left femur without   significant displacement. Bilateral femoral heads are well situated   within the acetabulum. Moderate degenerative changes of the lumbar spine.    < end of copied text >  < from: Xray Chest 1 View AP/PA (01.10.24 @ 11:05) >    IMPRESSION:  No acute traumatic findings.  Increased vascularity more on the right lung with faint nodularity   overlying the periphery of the right upper lobe.    < end of copied text >      EKG Personally Reviewed:  Normal sinus rhythm no signs of ischemia   Consultant(s) Notes Reviewed:      Care Discussed with Consultants/Other Providers:   Chief Complaint:    HPI: 78-year-old female with past medical history of hypertension, asthma, type 2 diabetes non-insulin-dependent presenting with  left hip pain and inability to ambulate after a fall.  Patient found to have hip fracture.  Medicine consulted for medicine pre-op;.Patient examined at bedside with Sharlene  with family at bedside. Denies. Chest pain or SOB. Able to perform 4 METS - reports being able to do house work without sob. No history of heart attack and stroke. Had a prior surgery. No issues with anesthesia.       PAST MEDICAL & SURGICAL HISTORY:  HTN (hypertension)      Asthma      No significant past surgical history          Review of Systems:   CONSTITUTIONAL: No fever.  EYES: No eye pain or discharge.  ENMT:  No sinus or throat pain  NECK: No pain or stiffness  RESPIRATORY: No cough, wheezing, chills or hemoptysis; No shortness of breath  CARDIOVASCULAR: No chest pain, palpitations, dizziness, or leg swelling  GASTROINTESTINAL: No abdominal or epigastric pain. No nausea, vomiting, or hematemesis; No diarrhea or constipation. No melena or hematochezia.  GENITOURINARY: No dysuria or incontinence  NEUROLOGICAL: No headaches, memory loss, loss of strength, numbness, or tremors  SKIN: No rashes.  LYMPH NODES: No enlarged glands  ENDOCRINE: No heat or cold intolerance; No hair loss  MUSCULOSKELETAL: No joint pain or swelling; No muscle, back, or extremity pain  PSYCHIATRIC: No depression, anxiety, mood swings, or difficulty sleeping  HEME/LYMPH: No easy bruising, or bleeding gums  ALLERY AND IMMUNOLOGIC: No hives or eczema    Allergies    No Known Allergies    Intolerances        Social History:  Denies toxic habits     FAMILY HISTORY:  No pertinent family history in first degree relatives        Home Medications: [id number], valsartan 320,Famotidine,Meloxacam       MEDICATIONS  (STANDING):  chlorhexidine 2% Cloths 1 Application(s) Topical once  povidone iodine 5% Nasal Swab 1 Application(s) Both Nostrils once  sodium chloride 0.9%. 1000 milliLiter(s) (100 mL/Hr) IV Continuous <Continuous>    MEDICATIONS  (PRN):      CAPILLARY BLOOD GLUCOSE      POCT Blood Glucose.: 158 mg/dL (10 Jose E 2024 08:04)    I&O's Summary      PHYSICAL EXAM:  Vital Signs Last 24 Hrs  T(C): 37 (10 Jose E 2024 08:03), Max: 37 (10 Jose E 2024 08:03)  T(F): 98.6 (10 Jose E 2024 08:03), Max: 98.6 (10 Jose E 2024 08:03)  HR: 98 (10 Jose E 2024 12:37) (94 - 98)  BP: 130/60 (10 Jose E 2024 12:37) (121/76 - 130/60)  BP(mean): --  RR: 18 (10 Jose E 2024 12:37) (18 - 18)  SpO2: 93% (10 Jose E 2024 12:37) (93% - 99%)    Parameters below as of 10 Jose E 2024 12:37  Patient On (Oxygen Delivery Method): room air      GENERAL: Elderly woman in NAD, well-developed  HEAD:  Atraumatic, Normocephalic  EYES: EOMI, PERRLA, conjunctiva and sclera clear  NECK: Supple, No JVD  CHEST/LUNG: Clear to auscultation bilaterally; No wheeze  HEART: Regular rate and rhythm; No murmurs, rubs, or gallops  ABDOMEN: Soft, Nontender, Nondistended; Bowel sounds present  EXTREMITIES:  2+ Peripheral Pulses, No clubbing, cyanosis, or edema  PSYCH: AAOx3  NEUROLOGY: non-focal  SKIN: No rashes or lesions    LABS:                        11.6   13.55 )-----------( 296      ( 10 Jose E 2024 09:00 )             36.3     01-10    137  |  102  |  12  ----------------------------<  160<H>  4.0   |  25  |  0.61    Ca    8.9      10 Jose E 2024 09:00    TPro  6.8  /  Alb  4.0  /  TBili  0.3  /  DBili  x   /  AST  14  /  ALT  13  /  AlkPhos  78  01-10    PT/INR - ( 10 Jose E 2024 09:00 )   PT: 11.1 sec;   INR: 0.99 ratio         PTT - ( 10 Jose E 2024 09:00 )  PTT:28.0 sec      Urinalysis Basic - ( 10 Jose E 2024 09:00 )    Color: x / Appearance: x / SG: x / pH: x  Gluc: 160 mg/dL / Ketone: x  / Bili: x / Urobili: x   Blood: x / Protein: x / Nitrite: x   Leuk Esterase: x / RBC: x / WBC x   Sq Epi: x / Non Sq Epi: x / Bacteria: x        RADIOLOGY & ADDITIONAL TESTS:    Imaging Personally Reviewed:< from: Xray Hip w/ Pelvis 2 or 3 Views, Left (01.10.24 @ 11:06) >  IMPRESSION: Intertrochanteric fracture of the left femur without   significant displacement. Bilateral femoral heads are well situated   within the acetabulum. Moderate degenerative changes of the lumbar spine.    < end of copied text >  < from: Xray Chest 1 View AP/PA (01.10.24 @ 11:05) >    IMPRESSION:  No acute traumatic findings.  Increased vascularity more on the right lung with faint nodularity   overlying the periphery of the right upper lobe.    < end of copied text >      EKG Personally Reviewed:  Normal sinus rhythm no signs of ischemia   Consultant(s) Notes Reviewed:      Care Discussed with Consultants/Other Providers:

## 2024-01-10 NOTE — ED ADULT NURSE NOTE - NSFALLRISKINTERV_ED_ALL_ED
Assistance OOB with selected safe patient handling equipment if applicable/Assistance with ambulation/Communicate fall risk and risk factors to all staff, patient, and family/Monitor gait and stability/Provide patient with walking aids/Provide visual cue: yellow wristband, yellow gown, etc/Reinforce activity limits and safety measures with patient and family/Call bell, personal items and telephone in reach/Instruct patient to call for assistance before getting out of bed/chair/stretcher/Non-slip footwear applied when patient is off stretcher/[place] to call system/Physically safe environment - no spills, clutter or unnecessary equipment/Purposeful Proactive Rounding/Room/bathroom lighting operational, light cord in reach Assistance OOB with selected safe patient handling equipment if applicable/Assistance with ambulation/Communicate fall risk and risk factors to all staff, patient, and family/Monitor gait and stability/Provide patient with walking aids/Provide visual cue: yellow wristband, yellow gown, etc/Reinforce activity limits and safety measures with patient and family/Call bell, personal items and telephone in reach/Instruct patient to call for assistance before getting out of bed/chair/stretcher/Non-slip footwear applied when patient is off stretcher/[place] to call system/Physically safe environment - no spills, clutter or unnecessary equipment/Purposeful Proactive Rounding/Room/bathroom lighting operational, light cord in reach

## 2024-01-10 NOTE — DISCHARGE NOTE PROVIDER - HOSPITAL COURSE
This is a 77yo Female with PMH of DM, HTN, and PSH R TKA (Dr. Jiménez) who presents to [facility] c/o left hip pain s/p mechanical fall. Patient was found to have a left IT fx and admitted to orthopedic surgery. Patient s/p left femur intramedullary nailing with Dr. Goddard on 1/10/2024. Patient tolerated the procedure well without any intraoperative complications. Patient tolerated physical therapy well, and the pain was controlled. Patient is weight bearing as tolerated with cane/walker as needed. Seen by medical attending for continuity of care and management and cleared for safe discharge. Keep dressing/incision clean, dry and intact. Any suture/staples to be removed on post-op day #14 your office visit. Patient is on 40mg of lovenox for DVT prophylaxis, please take for 6 weeks unless otherwise instructed by your surgeon. Please follow up with Dr. Goddard in 2 weeks, call the office to make an appointment, [phone]. Please follow up with your PMD for continuity of care and management as medications may have changed. This is a 79yo Female with PMH of DM, HTN, and PSH R TKA (Dr. Jiménez) who presents to [facility] c/o left hip pain s/p mechanical fall. Patient was found to have a left IT fx and admitted to orthopedic surgery. Patient s/p left femur intramedullary nailing with Dr. Goddard on 1/10/2024. Patient tolerated the procedure well without any intraoperative complications. Patient tolerated physical therapy well, and the pain was controlled. Patient is weight bearing as tolerated with cane/walker as needed. Seen by medical attending for continuity of care and management and cleared for safe discharge. Keep dressing/incision clean, dry and intact. Any suture/staples to be removed on post-op day #14 your office visit. Patient is on 40mg of lovenox for DVT prophylaxis, please take for 6 weeks unless otherwise instructed by your surgeon. Please follow up with Dr. Goddard in 2 weeks, call the office to make an appointment, [phone]. Please follow up with your PMD for continuity of care and management as medications may have changed. This is a 79yo Female with PMH of DM, HTN, and PSH R TKA (Dr. Jiménez) who presents to [facility] c/o left hip pain s/p mechanical fall. Patient was found to have a left IT fx and admitted to orthopedic surgery. Patient s/p left femur intramedullary nailing with Dr. Goddard on 1/10/2024. Patient tolerated the procedure well without any intraoperative complications. Patient tolerated physical therapy well, and the pain was controlled. Patient is weight bearing as tolerated with cane/walker as needed. Seen by medical attending for continuity of care and management and cleared for safe discharge. Keep dressing/incision clean, dry and intact. Any suture/staples to be removed on post-op day #14 your office visit. Patient is on 40mg of lovenox for DVT prophylaxis, please take for 6 weeks unless otherwise instructed by your surgeon. Please follow up with Dr. Goddard in 2 weeks, call the office to make an appointment, [phone]. Please follow up with your PMD for continuity of care and management as medications may have changed. This is a 77yo Female with PMH of DM, HTN, and PSH R TKA (Dr. Jiménez) who presents to [facility] c/o left hip pain s/p mechanical fall. Patient was found to have a left IT fx and admitted to orthopedic surgery. Patient s/p left femur intramedullary nailing with Dr. Goddard on 1/10/2024. Patient tolerated the procedure well without any intraoperative complications. Patient tolerated physical therapy well, and the pain was controlled. Patient is weight bearing as tolerated with cane/walker as needed. Seen by medical attending for continuity of care and management and cleared for safe discharge. Keep dressing/incision clean, dry and intact. Any suture/staples to be removed on post-op day #14 your office visit. Patient is on 40mg of lovenox for DVT prophylaxis, please take for 6 weeks unless otherwise instructed by your surgeon. Please follow up with Dr. Goddard in 2 weeks, call the office to make an appointment, [phone]. Please follow up with your PMD for continuity of care and management as medications may have changed.

## 2024-01-10 NOTE — H&P ADULT - NSHPLANGLIMITEDENGLISH_GEN_A_CORE
Yes
26 yo M with untreated GERD, presenting with symptoms of dyspepsia. VSS. Abd is soft and nontender in all quadrants. Pt feels better now and states he feels hungry and wants to eat. Labs unremarkable. Pt received IV hydration and IV pepcid/zofran. Stable for discharge and f/u wit GI outpt.

## 2024-01-10 NOTE — BRIEF OPERATIVE NOTE - NSICDXBRIEFPOSTOP_GEN_ALL_CORE_FT
POST-OP DIAGNOSIS:  Fracture, intertrochanteric, left femur 10-Jose E-2024 19:55:49  Ilana Reynoso

## 2024-01-10 NOTE — H&P ADULT - ASSESSMENT
A/P: 78y Female with L IT fracture pending orthopedic surgery    - Admit to Orthopedics under Dr. Goddard   - OR for left hip intramedullary nailing   - Pain control  - NPO, IV fluids  - NWB  LLE, bedrest  - Medical clearance/optimization for OR  - FU preop labs  - Discussed with orthopedic attending on-call, Dr. Goddard

## 2024-01-10 NOTE — ED ADULT NURSE NOTE - OBJECTIVE STATEMENT
assumed care of pt at 0845. granddaughter at bedside for translation. pt reports mechanical fall this morning. denies dizziness, chest pain. denies loc, blood thinners or head strike. c/o left hip pain. rr even and unlabored. anox4. pmh of htn and dm. Iv in place from EMS. pt educated on plan of care, pt able to successfully teach back plan of care to RN, RN will continue to reeducate pt during hospital stay.

## 2024-01-10 NOTE — PROGRESS NOTE ADULT - ASSESSMENT
A/P: Patient is a 78y y/o Female s/p left hip IMN, POD #0   - Pain control  - Antibiotics - Ancef postop  - DVT ppx  - Lovenox  - Incentive spirometry  - Venodynes  - F/U AM Labs  - PT/OT/WBAT  - Notify Orthopedics with any questions

## 2024-01-10 NOTE — PROGRESS NOTE ADULT - SUBJECTIVE AND OBJECTIVE BOX
Orthopedics Post-Op Check:  Patient was seen and examined at bedside. Denies CP/SOB/Dizziness, N/V/D, HA. Pain is controlled.     Vital Signs Last 24 Hrs  T(C): 36.8 (10 Jose E 2024 16:34), Max: 37 (10 Jose E 2024 08:03)  T(F): 98.3 (10 Jose E 2024 16:34), Max: 98.6 (10 Jose E 2024 08:03)  HR: 98 (10 Jose E 2024 16:34) (94 - 98)  BP: 154/78 (10 Jose E 2024 16:34) (121/56 - 154/78)  BP(mean): 72 (10 Jose E 2024 15:20) (72 - 72)  RR: 16 (10 Jose E 2024 16:34) (16 - 18)  SpO2: 98% (10 Jose E 2024 16:34) (93% - 99%)    Parameters below as of 10 Jose E 2024 16:34  Patient On (Oxygen Delivery Method): nasal cannula    Labs:                        11.6   13.55 )-----------( 296      ( 10 Jose E 2024 09:00 )             36.3     01-10    137  |  102  |  12  ----------------------------<  160<H>  4.0   |  25  |  0.61    Ca    8.9      10 Jose E 2024 09:00    TPro  6.8  /  Alb  4.0  /  TBili  0.3  /  DBili  x   /  AST  14  /  ALT  13  /  AlkPhos  78  01-10    Physical Exam:  Gen: NAD  R/L LE:   Dressing C/D/I  Motor intact + EHL/FHL/TA/GS. Sensation is grossly intact.   Compartments are soft, extremities are warm, DP 2+

## 2024-01-10 NOTE — H&P ADULT - NSHPLABSRESULTS_GEN_ALL_CORE
11.6   13.55 )-----------( 296      ( 10 Jose E 2024 09:00 )             36.3     10 Jose E 2024 09:00    137    |  102    |  12     ----------------------------<  160    4.0     |  25     |  0.61     Ca    8.9        10 Jose E 2024 09:00    TPro  6.8    /  Alb  4.0    /  TBili  0.3    /  DBili  x      /  AST  14     /  ALT  13     /  AlkPhos  78     10 Jose E 2024 09:00    PT/INR - ( 10 Jose E 2024 09:00 )   PT: 11.1 sec;   INR: 0.99 ratio         PTT - ( 10 Jose E 2024 09:00 )  PTT:28.0 sec    < from: Xray Hip w/ Pelvis 2 or 3 Views, Left (01.10.24 @ 11:06) >    IMPRESSION: Intertrochanteric fracture of the left femur without   significant displacement. Bilateral femoral heads are well situated   within the acetabulum. Moderate degenerative changes of the lumbar spine.    < end of copied text > 11.6   13.55 )-----------( 296      ( 10 Jose E 2024 09:00 )             36.3     10 Josee  2024 09:00    137    |  102    |  12     ----------------------------<  160    4.0     |  25     |  0.61     Ca    8.9        10 Jose E 2024 09:00    TPro  6.8    /  Alb  4.0    /  TBili  0.3    /  DBili  x      /  AST  14     /  ALT  13     /  AlkPhos  78     10 Jose E 2024 09:00    PT/INR - ( 10 Jose E 2024 09:00 )   PT: 11.1 sec;   INR: 0.99 ratio         PTT - ( 10 Jose E 2024 09:00 )  PTT:28.0 sec    < from: Xray Hip w/ Pelvis 2 or 3 Views, Left (01.10.24 @ 11:06) >    IMPRESSION: Intertrochanteric fracture of the left femur without   significant displacement. Bilateral femoral heads are well situated   within the acetabulum. Moderate degenerative changes of the lumbar spine.    < end of copied text >

## 2024-01-11 ENCOUNTER — TRANSCRIPTION ENCOUNTER (OUTPATIENT)
Age: 79
End: 2024-01-11

## 2024-01-11 LAB
A1C WITH ESTIMATED AVERAGE GLUCOSE RESULT: 6.8 % — HIGH (ref 4–5.6)
A1C WITH ESTIMATED AVERAGE GLUCOSE RESULT: 6.8 % — HIGH (ref 4–5.6)
ANION GAP SERPL CALC-SCNC: 12 MMOL/L — SIGNIFICANT CHANGE UP (ref 7–14)
ANION GAP SERPL CALC-SCNC: 12 MMOL/L — SIGNIFICANT CHANGE UP (ref 7–14)
BASOPHILS # BLD AUTO: 0.04 K/UL — SIGNIFICANT CHANGE UP (ref 0–0.2)
BASOPHILS # BLD AUTO: 0.04 K/UL — SIGNIFICANT CHANGE UP (ref 0–0.2)
BASOPHILS NFR BLD AUTO: 0.4 % — SIGNIFICANT CHANGE UP (ref 0–2)
BASOPHILS NFR BLD AUTO: 0.4 % — SIGNIFICANT CHANGE UP (ref 0–2)
BUN SERPL-MCNC: 12 MG/DL — SIGNIFICANT CHANGE UP (ref 7–23)
BUN SERPL-MCNC: 12 MG/DL — SIGNIFICANT CHANGE UP (ref 7–23)
CALCIUM SERPL-MCNC: 7.8 MG/DL — LOW (ref 8.4–10.5)
CALCIUM SERPL-MCNC: 7.8 MG/DL — LOW (ref 8.4–10.5)
CHLORIDE SERPL-SCNC: 103 MMOL/L — SIGNIFICANT CHANGE UP (ref 98–107)
CHLORIDE SERPL-SCNC: 103 MMOL/L — SIGNIFICANT CHANGE UP (ref 98–107)
CO2 SERPL-SCNC: 23 MMOL/L — SIGNIFICANT CHANGE UP (ref 22–31)
CO2 SERPL-SCNC: 23 MMOL/L — SIGNIFICANT CHANGE UP (ref 22–31)
CREAT SERPL-MCNC: 0.63 MG/DL — SIGNIFICANT CHANGE UP (ref 0.5–1.3)
CREAT SERPL-MCNC: 0.63 MG/DL — SIGNIFICANT CHANGE UP (ref 0.5–1.3)
EGFR: 92 ML/MIN/1.73M2 — SIGNIFICANT CHANGE UP
EGFR: 92 ML/MIN/1.73M2 — SIGNIFICANT CHANGE UP
EOSINOPHIL # BLD AUTO: 0 K/UL — SIGNIFICANT CHANGE UP (ref 0–0.5)
EOSINOPHIL # BLD AUTO: 0 K/UL — SIGNIFICANT CHANGE UP (ref 0–0.5)
EOSINOPHIL NFR BLD AUTO: 0 % — SIGNIFICANT CHANGE UP (ref 0–6)
EOSINOPHIL NFR BLD AUTO: 0 % — SIGNIFICANT CHANGE UP (ref 0–6)
ESTIMATED AVERAGE GLUCOSE: 148 — SIGNIFICANT CHANGE UP
ESTIMATED AVERAGE GLUCOSE: 148 — SIGNIFICANT CHANGE UP
GLUCOSE BLDC GLUCOMTR-MCNC: 121 MG/DL — HIGH (ref 70–99)
GLUCOSE BLDC GLUCOMTR-MCNC: 121 MG/DL — HIGH (ref 70–99)
GLUCOSE BLDC GLUCOMTR-MCNC: 124 MG/DL — HIGH (ref 70–99)
GLUCOSE BLDC GLUCOMTR-MCNC: 124 MG/DL — HIGH (ref 70–99)
GLUCOSE BLDC GLUCOMTR-MCNC: 153 MG/DL — HIGH (ref 70–99)
GLUCOSE BLDC GLUCOMTR-MCNC: 160 MG/DL — HIGH (ref 70–99)
GLUCOSE BLDC GLUCOMTR-MCNC: 160 MG/DL — HIGH (ref 70–99)
GLUCOSE SERPL-MCNC: 137 MG/DL — HIGH (ref 70–99)
GLUCOSE SERPL-MCNC: 137 MG/DL — HIGH (ref 70–99)
HCT VFR BLD CALC: 31.1 % — LOW (ref 34.5–45)
HCT VFR BLD CALC: 31.1 % — LOW (ref 34.5–45)
HCV AB S/CO SERPL IA: 0.12 S/CO — SIGNIFICANT CHANGE UP (ref 0–0.99)
HCV AB S/CO SERPL IA: 0.12 S/CO — SIGNIFICANT CHANGE UP (ref 0–0.99)
HCV AB SERPL-IMP: SIGNIFICANT CHANGE UP
HCV AB SERPL-IMP: SIGNIFICANT CHANGE UP
HGB BLD-MCNC: 9.8 G/DL — LOW (ref 11.5–15.5)
HGB BLD-MCNC: 9.8 G/DL — LOW (ref 11.5–15.5)
IANC: 8.63 K/UL — HIGH (ref 1.8–7.4)
IANC: 8.63 K/UL — HIGH (ref 1.8–7.4)
IMM GRANULOCYTES NFR BLD AUTO: 0.4 % — SIGNIFICANT CHANGE UP (ref 0–0.9)
IMM GRANULOCYTES NFR BLD AUTO: 0.4 % — SIGNIFICANT CHANGE UP (ref 0–0.9)
LYMPHOCYTES # BLD AUTO: 1.36 K/UL — SIGNIFICANT CHANGE UP (ref 1–3.3)
LYMPHOCYTES # BLD AUTO: 1.36 K/UL — SIGNIFICANT CHANGE UP (ref 1–3.3)
LYMPHOCYTES # BLD AUTO: 12.6 % — LOW (ref 13–44)
LYMPHOCYTES # BLD AUTO: 12.6 % — LOW (ref 13–44)
MCHC RBC-ENTMCNC: 25.5 PG — LOW (ref 27–34)
MCHC RBC-ENTMCNC: 25.5 PG — LOW (ref 27–34)
MCHC RBC-ENTMCNC: 31.5 GM/DL — LOW (ref 32–36)
MCHC RBC-ENTMCNC: 31.5 GM/DL — LOW (ref 32–36)
MCV RBC AUTO: 81 FL — SIGNIFICANT CHANGE UP (ref 80–100)
MCV RBC AUTO: 81 FL — SIGNIFICANT CHANGE UP (ref 80–100)
MONOCYTES # BLD AUTO: 0.74 K/UL — SIGNIFICANT CHANGE UP (ref 0–0.9)
MONOCYTES # BLD AUTO: 0.74 K/UL — SIGNIFICANT CHANGE UP (ref 0–0.9)
MONOCYTES NFR BLD AUTO: 6.8 % — SIGNIFICANT CHANGE UP (ref 2–14)
MONOCYTES NFR BLD AUTO: 6.8 % — SIGNIFICANT CHANGE UP (ref 2–14)
NEUTROPHILS # BLD AUTO: 8.63 K/UL — HIGH (ref 1.8–7.4)
NEUTROPHILS # BLD AUTO: 8.63 K/UL — HIGH (ref 1.8–7.4)
NEUTROPHILS NFR BLD AUTO: 79.8 % — HIGH (ref 43–77)
NEUTROPHILS NFR BLD AUTO: 79.8 % — HIGH (ref 43–77)
NRBC # BLD: 0 /100 WBCS — SIGNIFICANT CHANGE UP (ref 0–0)
NRBC # BLD: 0 /100 WBCS — SIGNIFICANT CHANGE UP (ref 0–0)
NRBC # FLD: 0 K/UL — SIGNIFICANT CHANGE UP (ref 0–0)
NRBC # FLD: 0 K/UL — SIGNIFICANT CHANGE UP (ref 0–0)
PLATELET # BLD AUTO: 239 K/UL — SIGNIFICANT CHANGE UP (ref 150–400)
PLATELET # BLD AUTO: 239 K/UL — SIGNIFICANT CHANGE UP (ref 150–400)
POTASSIUM SERPL-MCNC: 4.2 MMOL/L — SIGNIFICANT CHANGE UP (ref 3.5–5.3)
POTASSIUM SERPL-MCNC: 4.2 MMOL/L — SIGNIFICANT CHANGE UP (ref 3.5–5.3)
POTASSIUM SERPL-SCNC: 4.2 MMOL/L — SIGNIFICANT CHANGE UP (ref 3.5–5.3)
POTASSIUM SERPL-SCNC: 4.2 MMOL/L — SIGNIFICANT CHANGE UP (ref 3.5–5.3)
RBC # BLD: 3.84 M/UL — SIGNIFICANT CHANGE UP (ref 3.8–5.2)
RBC # BLD: 3.84 M/UL — SIGNIFICANT CHANGE UP (ref 3.8–5.2)
RBC # FLD: 14.6 % — HIGH (ref 10.3–14.5)
RBC # FLD: 14.6 % — HIGH (ref 10.3–14.5)
SODIUM SERPL-SCNC: 138 MMOL/L — SIGNIFICANT CHANGE UP (ref 135–145)
SODIUM SERPL-SCNC: 138 MMOL/L — SIGNIFICANT CHANGE UP (ref 135–145)
WBC # BLD: 10.81 K/UL — HIGH (ref 3.8–10.5)
WBC # BLD: 10.81 K/UL — HIGH (ref 3.8–10.5)
WBC # FLD AUTO: 10.81 K/UL — HIGH (ref 3.8–10.5)
WBC # FLD AUTO: 10.81 K/UL — HIGH (ref 3.8–10.5)

## 2024-01-11 RX ORDER — CEFAZOLIN SODIUM 1 G
2000 VIAL (EA) INJECTION ONCE
Refills: 0 | Status: COMPLETED | OUTPATIENT
Start: 2024-01-11 | End: 2024-01-11

## 2024-01-11 RX ADMIN — POLYETHYLENE GLYCOL 3350 17 GRAM(S): 17 POWDER, FOR SOLUTION ORAL at 13:43

## 2024-01-11 RX ADMIN — Medication 975 MILLIGRAM(S): at 22:11

## 2024-01-11 RX ADMIN — Medication 100 MILLIGRAM(S): at 15:04

## 2024-01-11 RX ADMIN — Medication 975 MILLIGRAM(S): at 05:59

## 2024-01-11 RX ADMIN — MONTELUKAST 10 MILLIGRAM(S): 4 TABLET, CHEWABLE ORAL at 12:24

## 2024-01-11 RX ADMIN — Medication 975 MILLIGRAM(S): at 21:11

## 2024-01-11 RX ADMIN — VALSARTAN 320 MILLIGRAM(S): 80 TABLET ORAL at 06:00

## 2024-01-11 RX ADMIN — Medication 975 MILLIGRAM(S): at 00:08

## 2024-01-11 RX ADMIN — SODIUM CHLORIDE 100 MILLILITER(S): 9 INJECTION INTRAMUSCULAR; INTRAVENOUS; SUBCUTANEOUS at 00:11

## 2024-01-11 RX ADMIN — Medication 975 MILLIGRAM(S): at 01:00

## 2024-01-11 RX ADMIN — Medication 975 MILLIGRAM(S): at 16:05

## 2024-01-11 RX ADMIN — PANTOPRAZOLE SODIUM 40 MILLIGRAM(S): 20 TABLET, DELAYED RELEASE ORAL at 07:44

## 2024-01-11 RX ADMIN — Medication 975 MILLIGRAM(S): at 15:05

## 2024-01-11 RX ADMIN — Medication 100 MILLIGRAM(S): at 03:32

## 2024-01-11 RX ADMIN — Medication 975 MILLIGRAM(S): at 06:45

## 2024-01-11 RX ADMIN — Medication 2: at 12:23

## 2024-01-11 NOTE — PHYSICAL THERAPY INITIAL EVALUATION ADULT - LEVEL OF INDEPENDENCE: SUPINE/SIT, REHAB EVAL
Attempted to sit pt at edge of bed; able to get left lower extremity to side of edge of bed; pt requesting to stop 2/2 7/10 pain; pt returned to bed; RN aware/unable to perform

## 2024-01-11 NOTE — PHYSICAL THERAPY INITIAL EVALUATION ADULT - GENERAL OBSERVATIONS, REHAB EVAL
Pt encountered in semisupine position, no distress, AxOx4, with +IV, +2L of O2 through nasal cannula, +pulse oximeter, left hip dressing dry/intact. Pt agreeable to participate in PT evauation.

## 2024-01-11 NOTE — PROGRESS NOTE ADULT - NS ATTEND AMEND GEN_ALL_CORE FT
Patient seen and examined with family at bedside. Wicho Prabhakar is a 78 year old female now status post Left Hip Intramedullary Nail. No acute events overnight.    Physical Exam:  Dressing: Clean, Dry Intact  Motor: Intact EHL/FHL/Tibialis Anterior/Gastrocnemius  Sensory: Intact Superficial Peroneal/Deep Peroneal/Saphenous/Sural/Tibial Nerves  Vascular: 2+ DP Pulse    Assessment/Plan:  Wicho Prabhakar is a 78 year old female now status post Left Hip Intramedullary Nail.    Plan:  -Left Lower Extremity: Weight Bearing as Tolerated  -PT/OT, Out of Bed  -Pain Control: Per Protocol  -DVT Prophylaxis: Lovenox  -Antibiotics: Ancef 2g x24 hours  -Disposition: Pending Patient seen and examined with family at bedside. iWcho Prabhakar is a 78 year old female now status post Left Hip Intramedullary Nail. No acute events overnight.    Physical Exam:  Dressing: Clean, Dry Intact  Motor: Intact EHL/FHL/Tibialis Anterior/Gastrocnemius  Sensory: Intact Superficial Peroneal/Deep Peroneal/Saphenous/Sural/Tibial Nerves  Vascular: 2+ DP Pulse    Assessment/Plan:  Wicho Prabhakar is a 78 year old female now status post Left Hip Intramedullary Nail.    Plan:  -Left Lower Extremity: Weight Bearing as Tolerated  -PT/OT, Out of Bed  -Pain Control: Per Protocol  -DVT Prophylaxis: Lovenox  -Antibiotics: Ancef 2g x24 hours  -Disposition: Pending

## 2024-01-11 NOTE — PHYSICAL THERAPY INITIAL EVALUATION ADULT - PERTINENT HX OF CURRENT PROBLEM, REHAB EVAL
78y [place]jarati Speaking Female with PMH of DM, asthma, HTN and PSH right TKA (Dr. Jiménez)  presents to [facility] with c/o Left hip pain s/p mechanical fall. Patient was walking at home and fell onto her left side while turning. Patient has been unable to ambulated after the fall 2/2 pain. In the ED patient was found to have a left intertrochanteric fx. Pt denies head strike or LOC, numbness/tingling or any other orthopedic pain/injury. At baseline, pt ambulates with a cane. At the time of her fall she was ambulating without her cane. 78y [place]jar[place] Speaking Female with PMH of DM, asthma, HTN and PSH right TKA (Dr. Jiménez)  presents to [facility] with c/o Left hip pain s/p mechanical fall. Patient was walking at home and fell onto her left side while turning. Patient has been unable to ambulated after the fall 2/2 pain. In the ED patient was found to have a left intertrochanteric fx. Pt denies head strike or LOC, numbness/tingling or any other orthopedic pain/injury. At baseline, pt ambulates with a cane. At the time of her fall she was ambulating without her cane.

## 2024-01-11 NOTE — PHYSICAL THERAPY INITIAL EVALUATION ADULT - MANUAL MUSCLE TESTING RESULTS, REHAB EVAL
pain; bilateral upper extremities 4/5, Right lower extremity 3/5, Left lower extremity 2/5 except left ankle 3/5/grossly assessed due to

## 2024-01-11 NOTE — DISCHARGE NOTE NURSING/CASE MANAGEMENT/SOCIAL WORK - NSDPFAC_GEN_ALL_CORE
The patient's HeartMate LVAD was interrogated 8/7/2023  * Speed 5900 rpm   * Pulsatility index 3.4   * Power 4.5 Driver   * Flow 5.1 L/minute   Fluid status: hypervolemic   Alarms were reviewed, and notable for no alarms.   The driveline exit site was inspected, c/d/i.   All external components were inspected and showed no evidence of damage or malfunction, none replaced.   No changes to VAD settings made     Dieter

## 2024-01-11 NOTE — PROGRESS NOTE ADULT - SUBJECTIVE AND OBJECTIVE BOX
ORTHO PROGRESS NOTE     Pt seen and examined at bedside, denies SOB, CP, Dizziness. N/V/D /HA.  No significant overnight events. Pain well controlled.    Vital Signs Last 24 Hrs  T(C): 37.1 (11 Jan 2024 05:50), Max: 37.1 (10 Jose E 2024 19:50)  T(F): 98.7 (11 Jan 2024 05:50), Max: 98.8 (10 Jose E 2024 19:50)  HR: 73 (11 Jan 2024 05:50) (73 - 98)  BP: 130/54 (11 Jan 2024 05:50) (121/56 - 174/83)  BP(mean): 85 (10 Jose E 2024 23:00) (72 - 110)  RR: 16 (11 Jan 2024 05:50) (12 - 20)  SpO2: 99% (11 Jan 2024 05:50) (93% - 100%)    Parameters below as of 11 Jan 2024 05:50  Patient On (Oxygen Delivery Method): nasal cannula  O2 Flow (L/min): 2      Gen: No apparent distress    left LE:  Dressing C/D I    BLE: motor intact EHL/FHL/TA/GS .  Sensation is grossly intact to light touch in the distal extremities.  Compartments are soft bilaterally.  Extremities are warm .  DP 2+ BLE     Labs:  CBC Full  -  ( 10 Jose E 2024 20:20 )  WBC Count : 17.66 K/uL  RBC Count : 4.50 M/uL  Hemoglobin : 11.6 g/dL  Hematocrit : 36.9 %  Platelet Count - Automated : 253 K/uL  Mean Cell Volume : 82.0 fL  Mean Cell Hemoglobin : 25.8 pg  Mean Cell Hemoglobin Concentration : 31.4 gm/dL  Auto Neutrophil # : x  Auto Lymphocyte # : x  Auto Monocyte # : x  Auto Eosinophil # : x  Auto Basophil # : x  Auto Neutrophil % : x  Auto Lymphocyte % : x  Auto Monocyte % : x  Auto Eosinophil % : x  Auto Basophil % : x        01-10    137  |  104  |  13  ----------------------------<  158<H>  4.6   |  23  |  0.58    Ca    8.4      10 Jose E 2024 20:20  Phos  3.2     01-10  Mg     1.90     01-10    TPro  6.8  /  Alb  4.0  /  TBili  0.3  /  DBili  x   /  AST  14  /  ALT  13  /  AlkPhos  78  01-10         A/P  s/p left hip IMN, POD #1    - Pain control/ Analgesia  - DVT ppx Lovenox, foot pumps  - PT/OT - wbat/oob    - Incentive Spirometer  - notify Ortho for questions

## 2024-01-11 NOTE — PHYSICAL THERAPY INITIAL EVALUATION ADULT - DIAGNOSIS, PT EVAL
Pt s/p Intramedullary nailing of left femur on 01/10/2023; pt presents with decreased strength and decreased functional mobility.

## 2024-01-11 NOTE — DISCHARGE NOTE NURSING/CASE MANAGEMENT/SOCIAL WORK - NSDCPECAREGIVERED_GEN_ALL_CORE
Medline and [facility]aren[facility] for surgical procedure IM Nail for trochanter fracture, Diabetes, A1C,  Fall prevention, Incision care, pain mangement,  well as DC Medications and side effects literature for patient reference. Medline and [facility]aren[facility] for surgical procedure IM Nail for trochanter fracture, Diabetes, A1C,  Fall prevention, Incision care, pain management,  well as DC Medications and side effects literature for patient reference.

## 2024-01-11 NOTE — PHYSICAL THERAPY INITIAL EVALUATION ADULT - PATIENT PROFILE REVIEW, REHAB EVAL
OOB as per Ortho Note; Spoke with RN Nelda Hurtado prior to PT evaluation--> Pt OK for PT consult/OOB activity; vitals taken; 149/65mmHg, heart rate 83bpm/yes

## 2024-01-11 NOTE — DISCHARGE NOTE NURSING/CASE MANAGEMENT/SOCIAL WORK - NURSING SECTION COMPLETE
[employer]  [phone]    Chief Complaint: Polyp surveillance    PMH:   Past Medical History:   Diagnosis Date    Cataract     Chronic pain     lower back pain    Diabetes ([address])     Hypertension     Insomnia     Musculoskeletal disorder     herniated disk; bilat feet; left thumb;     Neuropathy     legs    Neuropathy     Thromboembolus (HCC)     Left Leg     Allergies:    Allergies   Allergen Reactions    Gabapentin Anaphylaxis    Penicillins Rash     Medications:   Current Facility-Administered Medications:     lactated ringers IV soln infusion, , IntraVENous, Continuous, Brein, Dunnegan, APRN - CRNA, Last Rate: 50 mL/hr at 23, New Bag at 23  FH:   Family History   Problem Relation Age of Onset    Diabetes Mother     Hypertension Mother     Diabetes Father     Hypertension Father     Diabetes Brother      Social:   Social History     Socioeconomic History    Marital status:      Spouse name: None    Number of children: None    Years of education: None    Highest education level: None   Tobacco Use    Smoking status: Every Day     Packs/day: .25     Types: Cigarettes    Smokeless tobacco: Never   Vaping Use    Vaping Use: Never used   Substance and Sexual Activity    Alcohol use: No     Alcohol/week: 0.0 standard drinks of alcohol    Drug use: Never     Surgical H:   Past Surgical History:   Procedure Laterality Date    CATARACT REMOVAL Left 2016     SECTION      CHOLECYSTECTOMY      COLONOSCOPY N/A 2017    COLONOSCOPY polypectomy performed by Ngoc Mullins MD at [facility] ENDOSCOPY    COLONOSCOPY N/A 2023    COLONOSCOPY with polypectomies performed by Elizabet Dominguez MD at [address] (CERVIX STATUS UNKNOWN)      ORTHOPEDIC SURGERY Left     Removal Bone Left Foot    ORTHOPEDIC SURGERY      left hand index/middle/ringer finger    ORTHOPEDIC SURGERY  20+    left thumb    ROTATOR CUFF REPAIR Left     TEMPORAL ARTERY LIGATN OR BX Bilateral Patient/Caregiver provided printed discharge information.

## 2024-01-11 NOTE — PHYSICAL THERAPY INITIAL EVALUATION ADULT - ADDITIONAL COMMENTS
Pt lives in a private house with her son, daughter in law, and 2 granddaughters with 3 steps to enter; (+)bilateral handrails; bedroom/bathroom is on the first floor. Prior to hospital admission, pt was completely independent and used a single axis cane PRN.     Pt left comfortable in bed, NAD, all lines intact, all precautions maintained, with call bell in reach, bed alarm on, granddaughter @bedside, and RN aware of PT evaluation and pt's pain.

## 2024-01-11 NOTE — CHART NOTE - NSCHARTNOTEFT_GEN_A_CORE
PCP is Dr. Jose Roberto Sprague.   Care to be resumed by Dr. Farnsworth from 1/11  Discussed with jose and Dr. Farnsworth

## 2024-01-11 NOTE — DISCHARGE NOTE NURSING/CASE MANAGEMENT/SOCIAL WORK - NSDCPEFALRISK_GEN_ALL_CORE
For information on Fall & Injury Prevention, visit: https://www.[facility]/news/fall-prevention-protects-and-maintains-health-and-mobility OR  https://www.[facility]/news/fall-prevention-tips-to-avoid-injury OR  https://www.cdc.gov/steadi/patient.html For information on Fall & Injury Prevention, visit: https://www.[facility]/news/fall-prevention-protects-and-maintains-health-and-mobility OR  https://www.[facility]/news/fall-prevention-tips-to-avoid-injury OR  https://www.cdc.gov/steadi/patient.html For information on Fall & Injury Prevention, visit: https://www.[facility]/news/fall-prevention-protects-and-maintains-health-and-mobility OR  https://www.[facility]/news/fall-prevention-tips-to-avoid-injury OR  https://www.cdc.gov/steadi/patient.html For information on Fall & Injury Prevention, visit: https://www.[facility]/news/fall-prevention-protects-and-maintains-health-and-mobility OR  https://www.[facility]/news/fall-prevention-tips-to-avoid-injury OR  https://www.cdc.gov/steadi/patient.html

## 2024-01-11 NOTE — DISCHARGE NOTE NURSING/CASE MANAGEMENT/SOCIAL WORK - PATIENT PORTAL LINK FT
You can access the FollowMyHealth Patient Portal offered by [facility] by registering at the following website: [email or web address]. By joining [employer]’s FollowMyHealth portal, you will also be able to view your health information using other applications (apps) compatible with our system. You can access the FollowMyHealth Patient Portal offered by [facility] by registering at the following website: [email or web address]. By joining [employer]’s FollowMyHealth portal, you will also be able to view your health information using other applications (apps) compatible with our system. You can access the FollowMyHealth Patient Portal offered by [facility] by registering at the following website: [email or web address]. By joining [employer]’s FollowMyHealth portal, you will also be able to view your health information using other applications (apps) compatible with our system. You can access the FollowMyHealth Patient Portal offered by [facility] by registering at the following website: [email or web address]. By joining [employer]’s FollowMyHealth portal, you will also be able to view your health information using other applications (apps) compatible with our system.

## 2024-01-12 LAB
GLUCOSE BLDC GLUCOMTR-MCNC: 123 MG/DL — HIGH (ref 70–99)
GLUCOSE BLDC GLUCOMTR-MCNC: 123 MG/DL — HIGH (ref 70–99)
GLUCOSE BLDC GLUCOMTR-MCNC: 144 MG/DL — HIGH (ref 70–99)
GLUCOSE BLDC GLUCOMTR-MCNC: 144 MG/DL — HIGH (ref 70–99)
GLUCOSE BLDC GLUCOMTR-MCNC: 160 MG/DL — HIGH (ref 70–99)
GLUCOSE BLDC GLUCOMTR-MCNC: 160 MG/DL — HIGH (ref 70–99)
GLUCOSE BLDC GLUCOMTR-MCNC: 167 MG/DL — HIGH (ref 70–99)
GLUCOSE BLDC GLUCOMTR-MCNC: 167 MG/DL — HIGH (ref 70–99)
GLUCOSE BLDC GLUCOMTR-MCNC: 230 MG/DL — HIGH (ref 70–99)
GLUCOSE BLDC GLUCOMTR-MCNC: 230 MG/DL — HIGH (ref 70–99)

## 2024-01-12 RX ADMIN — Medication 2: at 17:22

## 2024-01-12 RX ADMIN — VALSARTAN 320 MILLIGRAM(S): 80 TABLET ORAL at 05:05

## 2024-01-12 RX ADMIN — OXYCODONE HYDROCHLORIDE 10 MILLIGRAM(S): 5 TABLET ORAL at 21:29

## 2024-01-12 RX ADMIN — Medication 975 MILLIGRAM(S): at 17:21

## 2024-01-12 RX ADMIN — OXYCODONE HYDROCHLORIDE 10 MILLIGRAM(S): 5 TABLET ORAL at 22:29

## 2024-01-12 RX ADMIN — MONTELUKAST 10 MILLIGRAM(S): 4 TABLET, CHEWABLE ORAL at 12:39

## 2024-01-12 RX ADMIN — POLYETHYLENE GLYCOL 3350 17 GRAM(S): 17 POWDER, FOR SOLUTION ORAL at 12:39

## 2024-01-12 RX ADMIN — OXYCODONE HYDROCHLORIDE 5 MILLIGRAM(S): 5 TABLET ORAL at 04:47

## 2024-01-12 RX ADMIN — Medication 975 MILLIGRAM(S): at 05:06

## 2024-01-12 RX ADMIN — Medication 975 MILLIGRAM(S): at 06:06

## 2024-01-12 RX ADMIN — Medication 975 MILLIGRAM(S): at 18:21

## 2024-01-12 RX ADMIN — OXYCODONE HYDROCHLORIDE 10 MILLIGRAM(S): 5 TABLET ORAL at 09:06

## 2024-01-12 RX ADMIN — OXYCODONE HYDROCHLORIDE 5 MILLIGRAM(S): 5 TABLET ORAL at 03:47

## 2024-01-12 RX ADMIN — OXYCODONE HYDROCHLORIDE 10 MILLIGRAM(S): 5 TABLET ORAL at 10:06

## 2024-01-12 RX ADMIN — ENOXAPARIN SODIUM 40 MILLIGRAM(S): 100 INJECTION SUBCUTANEOUS at 05:06

## 2024-01-12 RX ADMIN — PANTOPRAZOLE SODIUM 40 MILLIGRAM(S): 20 TABLET, DELAYED RELEASE ORAL at 05:06

## 2024-01-12 NOTE — PROGRESS NOTE ADULT - SUBJECTIVE AND OBJECTIVE BOX
SUBJECTIVE  No acute events overnight. Pain controlled.    OBJECTIVE  Vital Signs Last 24 Hrs  T(C): 36.9 (12 Jan 2024 01:49), Max: 37.1 (11 Jan 2024 05:50)  T(F): 98.5 (12 Jan 2024 01:49), Max: 98.8 (11 Jan 2024 22:30)  HR: 80 (12 Jan 2024 01:49) (73 - 88)  BP: 142/60 (12 Jan 2024 01:49) (130/54 - 142/60)  BP(mean): 3 (11 Jan 2024 17:53) (3 - 3)  RR: 16 (12 Jan 2024 01:49) (16 - 18)  SpO2: 98% (12 Jan 2024 01:49) (97% - 100%)  Parameters below as of 12 Jan 2024 01:49  Patient On (Oxygen Delivery Method): nasal cannula  O2 Flow (L/min): 2    PHYSICAL EXAM  Gen: Lying in bed, NAD  Resp: No increased WOB  LLE:  Dressings c/d/i, compartments soft  Motor: TA/EHL/GS/FHL intact  Sensory: DP/SP/Tib/Nela/Saph SILT  +DP pulse, WWP    LABS                        9.8    10.81 )-----------( 239      ( 11 Jan 2024 06:00 )             31.1     01-11    138  |  103  |  12  ----------------------------<  137<H>  4.2   |  23  |  0.63    Ca    7.8<L>      11 Jan 2024 06:00  Phos  3.2     01-10  Mg     1.90     01-10    TPro  6.8  /  Alb  4.0  /  TBili  0.3  /  DBili  x   /  AST  14  /  ALT  13  /  AlkPhos  78  01-10    PT/INR - ( 10 Jose E 2024 09:00 )   PT: 11.1 sec;   INR: 0.99 ratio    PTT - ( 10 Jose E 2024 09:00 )  PTT:28.0 sec      ASSESSMENT & PLAN  78yFemale s/p L hip IMN on 1/10/24.  -WBAT LLE  -pain control  -ice/cold compress  -incentive spirometry  -DVT ppx: Lovenox  -PT/OT  -dispo: [facility]

## 2024-01-12 NOTE — OCCUPATIONAL THERAPY INITIAL EVALUATION ADULT - GENERAL OBSERVATIONS, REHAB EVAL
Upon entry, patient semi-supine in bed, spouse present at bedside +son on video call to translate, patient agreeable to OT eval, cleared for OT evaluation as per RN arthurine. Patient reports mild pain with movement, agreeable to OT eval, reported relief after session with rest and repositioning, RN aware. Patient left semi-supine in bed, spouse present at bedside, call bell in reach, all lines/tubes intact, bed alarm activated, vitals stable, NAD, all precautions maintained, RN aware of above.

## 2024-01-12 NOTE — OCCUPATIONAL THERAPY INITIAL EVALUATION ADULT - PERTINENT HX OF CURRENT PROBLEM, REHAB EVAL
Patient is a 78 year old female, s/p Intramedullary nailing of left femur 1/10/24. Initially presents to [facility] with c/o Left hip pain s/p mechanical fall. Patient was walking at home and fell onto her left side while turning. Patient has been unable to ambulate after the fall 2/2 pain. In the ED patient was found to have a left intertrochanteric fx. At baseline, pt ambulates with a cane. At the time of her fall she was ambulating without her cane Patient is a 78 year old female, s/p Intramedullary nailing of left femur 1/10/24. Initially presents to [facility] with c/o Left hip pain s/p mechanical fall. Patient was walking at home and fell onto her left side while turning. Patient has been unable to ambulate after the fall 2/2 pain. In the ED patient was found to have a left intertrochanteric fx. At baseline, pt ambulates with a cane. At the time of her fall she was ambulating without her cane

## 2024-01-12 NOTE — OCCUPATIONAL THERAPY INITIAL EVALUATION ADULT - ADDITIONAL COMMENTS
As per son, patient independent with ADLs prior to admission, ambulated with single point cane at baseline.

## 2024-01-12 NOTE — OCCUPATIONAL THERAPY INITIAL EVALUATION ADULT - DIAGNOSIS, OT EVAL
s/p Intramedullary nailing of left femur 1/10/24, s/p mechanical fall, decreased functional mobility, decreased ADL performance

## 2024-01-12 NOTE — PROGRESS NOTE ADULT - ASSESSMENT
78yFemale s/p L hip IMN on 1/10/24.    - pain control, PT    Htn - cont valsartan    Anemia- monitor for active signs of active bleeding    dm2- iss

## 2024-01-12 NOTE — OCCUPATIONAL THERAPY INITIAL EVALUATION ADULT - LIVES WITH, PROFILE
Lives with children and spouse in private home, 3 steps to enter and railings on both sides. Both bedroom and bathroom located on the main level of the home./children/spouse

## 2024-01-12 NOTE — PROGRESS NOTE ADULT - SUBJECTIVE AND OBJECTIVE BOX
SUBJECTIVE / OVERNIGHT EVENTS:pt seen and examined  01-12-24    MEDICATIONS  (STANDING):  acetaminophen     Tablet .. 975 milliGRAM(s) Oral every 8 hours  dextrose 5%. 1000 milliLiter(s) (100 mL/Hr) IV Continuous <Continuous>  dextrose 5%. 1000 milliLiter(s) (50 mL/Hr) IV Continuous <Continuous>  dextrose 50% Injectable 25 Gram(s) IV Push once  dextrose 50% Injectable 25 Gram(s) IV Push once  dextrose 50% Injectable 12.5 Gram(s) IV Push once  enoxaparin Injectable 40 milliGRAM(s) SubCutaneous every 24 hours  glucagon  Injectable 1 milliGRAM(s) IntraMuscular once  insulin lispro (ADMELOG) corrective regimen sliding scale   SubCutaneous three times a day before meals  insulin lispro (ADMELOG) corrective regimen sliding scale   SubCutaneous at bedtime  montelukast 10 milliGRAM(s) Oral daily  pantoprazole    Tablet 40 milliGRAM(s) Oral before breakfast  polyethylene glycol 3350 17 Gram(s) Oral daily  senna 2 Tablet(s) Oral at bedtime  sodium chloride 0.9%. 1000 milliLiter(s) (100 mL/Hr) IV Continuous <Continuous>  valsartan 320 milliGRAM(s) Oral daily    MEDICATIONS  (PRN):  albuterol    90 MICROgram(s) HFA Inhaler 2 Puff(s) Inhalation every 6 hours PRN Shortness of Breath and/or Wheezing  dextrose Oral Gel 15 Gram(s) Oral once PRN Blood Glucose LESS THAN 70 milliGRAM(s)/deciliter  melatonin 3 milliGRAM(s) Oral at bedtime PRN Insomnia  oxyCODONE    IR 10 milliGRAM(s) Oral every 4 hours PRN Severe Pain (7 - 10)  oxyCODONE    IR 5 milliGRAM(s) Oral every 4 hours PRN Moderate Pain (4 - 6)    T(C): 36.9 (01-12-24 @ 22:44), Max: 37.2 (01-12-24 @ 17:25)  HR: 82 (01-12-24 @ 22:44) (76 - 85)  BP: 129/51 (01-12-24 @ 22:44) (129/51 - 159/63)  RR: 18 (01-12-24 @ 22:44) (16 - 18)  SpO2: 94% (01-12-24 @ 17:25) (94% - 100%)    CAPILLARY BLOOD GLUCOSE      POCT Blood Glucose.: 230 mg/dL (12 Jan 2024 22:06)  POCT Blood Glucose.: 167 mg/dL (12 Jan 2024 16:39)  POCT Blood Glucose.: 144 mg/dL (12 Jan 2024 11:26)  POCT Blood Glucose.: 123 mg/dL (12 Jan 2024 07:24)    I&O's Summary    11 Jan 2024 07:01  -  12 Jan 2024 07:00  --------------------------------------------------------  IN: 0 mL / OUT: 2000 mL / NET: -2000 mL    12 Jan 2024 07:01  -  12 Jan 2024 23:58  --------------------------------------------------------  IN: 500 mL / OUT: 550 mL / NET: -50 mL        Constitutional: No fever, fatigue  Skin: No rash.  Eyes: No recent vision problems or eye pain.  ENT: No congestion, ear pain, or sore throat.  Cardiovascular: No chest pain or palpation.  Respiratory: No cough, shortness of breath, congestion, or wheezing.  Gastrointestinal: No abdominal pain, nausea, vomiting, or diarrhea.  Genitourinary: No dysuria.  Musculoskeletal: No joint swelling.  Neurologic: No headache.    PHYSICAL EXAM:  GENERAL: NAD  EYES: EOMI, PERRLA  NECK: Supple, No JVD  CHEST/LUNG: dec breath sounds at bases  HEART:  S1 , S2 +  ABDOMEN: soft , bs+  EXTREMITIES:  left lower ext dec rom  NEUROLOGY:alert awake      LABS:                        9.8    10.81 )-----------( 239      ( 11 Jan 2024 06:00 )             31.1     01-11    138  |  103  |  12  ----------------------------<  137<H>  4.2   |  23  |  0.63    Ca    7.8<L>      11 Jan 2024 06:00            Urinalysis Basic - ( 11 Jan 2024 06:00 )    Color: x / Appearance: x / SG: x / pH: x  Gluc: 137 mg/dL / Ketone: x  / Bili: x / Urobili: x   Blood: x / Protein: x / Nitrite: x   Leuk Esterase: x / RBC: x / WBC x   Sq Epi: x / Non Sq Epi: x / Bacteria: x        RADIOLOGY & ADDITIONAL TESTS:    Imaging Personally Reviewed:    Consultant(s) Notes Reviewed:      Care Discussed with Consultants/Other Providers:

## 2024-01-13 LAB
GLUCOSE BLDC GLUCOMTR-MCNC: 143 MG/DL — HIGH (ref 70–99)
GLUCOSE BLDC GLUCOMTR-MCNC: 143 MG/DL — HIGH (ref 70–99)
GLUCOSE BLDC GLUCOMTR-MCNC: 151 MG/DL — HIGH (ref 70–99)
GLUCOSE BLDC GLUCOMTR-MCNC: 151 MG/DL — HIGH (ref 70–99)
GLUCOSE BLDC GLUCOMTR-MCNC: 163 MG/DL — HIGH (ref 70–99)
GLUCOSE BLDC GLUCOMTR-MCNC: 163 MG/DL — HIGH (ref 70–99)
GLUCOSE BLDC GLUCOMTR-MCNC: 169 MG/DL — HIGH (ref 70–99)
GLUCOSE BLDC GLUCOMTR-MCNC: 169 MG/DL — HIGH (ref 70–99)

## 2024-01-13 RX ADMIN — Medication 975 MILLIGRAM(S): at 22:20

## 2024-01-13 RX ADMIN — VALSARTAN 320 MILLIGRAM(S): 80 TABLET ORAL at 05:38

## 2024-01-13 RX ADMIN — Medication 975 MILLIGRAM(S): at 21:20

## 2024-01-13 RX ADMIN — Medication 2: at 07:53

## 2024-01-13 RX ADMIN — Medication 975 MILLIGRAM(S): at 05:37

## 2024-01-13 RX ADMIN — Medication 2: at 17:00

## 2024-01-13 RX ADMIN — Medication 975 MILLIGRAM(S): at 06:39

## 2024-01-13 RX ADMIN — SENNA PLUS 2 TABLET(S): 8.6 TABLET ORAL at 21:20

## 2024-01-13 RX ADMIN — Medication 975 MILLIGRAM(S): at 14:52

## 2024-01-13 RX ADMIN — ENOXAPARIN SODIUM 40 MILLIGRAM(S): 100 INJECTION SUBCUTANEOUS at 05:37

## 2024-01-13 RX ADMIN — Medication 975 MILLIGRAM(S): at 14:22

## 2024-01-13 RX ADMIN — PANTOPRAZOLE SODIUM 40 MILLIGRAM(S): 20 TABLET, DELAYED RELEASE ORAL at 05:38

## 2024-01-13 RX ADMIN — MONTELUKAST 10 MILLIGRAM(S): 4 TABLET, CHEWABLE ORAL at 12:22

## 2024-01-13 NOTE — PROGRESS NOTE ADULT - ASSESSMENT
78yFemale s/p L hip IMN on 1/10/24.    - pain control, PT    Htn - cont valsartan, uncontrolled, recommend to start norvasc 5 mg qd    Anemia- monitor for active signs of active bleeding    dm2- iss

## 2024-01-13 NOTE — PROGRESS NOTE ADULT - SUBJECTIVE AND OBJECTIVE BOX
SUBJECTIVE  No acute events overnight. Pain controlled.    OBJECTIVE  Vital Signs Last 24 Hrs  T(C): 36.9 (13 Jan 2024 05:03), Max: 37.2 (12 Jan 2024 17:25)  T(F): 98.4 (13 Jan 2024 05:03), Max: 99 (12 Jan 2024 17:25)  HR: 80 (13 Jan 2024 05:03) (80 - 85)  BP: 155/63 (13 Jan 2024 05:03) (129/51 - 156/98)  RR: 18 (13 Jan 2024 05:03) (18 - 18)  SpO2: 96% (13 Jan 2024 05:03) (94% - 98%)  Parameters below as of 13 Jan 2024 05:03  Patient On (Oxygen Delivery Method): room air    PHYSICAL EXAM  Gen: Lying in bed, NAD  Resp: No increased WOB  LLE:  Dressings c/d/i, compartments soft  Motor: TA/EHL/GS/FHL intact  Sensory: DP/SP/Tib/Nela/Saph SILT  +DP pulse, WWP    LABS  No new labs    ASSESSMENT & PLAN  78yFemale s/p L hip IMN on 1/10/24.  -WBAT LLE  -pain control  -ice/cold compress  -incentive spirometry  -DVT ppx: Lovenox  -PT/OT  -dispo: [facility]

## 2024-01-13 NOTE — PROGRESS NOTE ADULT - SUBJECTIVE AND OBJECTIVE BOX
SUBJECTIVE / OVERNIGHT EVENTS:pt seen and examined  01-13-24    MEDICATIONS  (STANDING):  acetaminophen     Tablet .. 975 milliGRAM(s) Oral every 8 hours  dextrose 5%. 1000 milliLiter(s) (50 mL/Hr) IV Continuous <Continuous>  dextrose 5%. 1000 milliLiter(s) (100 mL/Hr) IV Continuous <Continuous>  dextrose 50% Injectable 25 Gram(s) IV Push once  dextrose 50% Injectable 25 Gram(s) IV Push once  dextrose 50% Injectable 12.5 Gram(s) IV Push once  enoxaparin Injectable 40 milliGRAM(s) SubCutaneous every 24 hours  glucagon  Injectable 1 milliGRAM(s) IntraMuscular once  insulin lispro (ADMELOG) corrective regimen sliding scale   SubCutaneous at bedtime  insulin lispro (ADMELOG) corrective regimen sliding scale   SubCutaneous three times a day before meals  montelukast 10 milliGRAM(s) Oral daily  pantoprazole    Tablet 40 milliGRAM(s) Oral before breakfast  polyethylene glycol 3350 17 Gram(s) Oral daily  senna 2 Tablet(s) Oral at bedtime  sodium chloride 0.9%. 1000 milliLiter(s) (100 mL/Hr) IV Continuous <Continuous>  valsartan 320 milliGRAM(s) Oral daily    MEDICATIONS  (PRN):  albuterol    90 MICROgram(s) HFA Inhaler 2 Puff(s) Inhalation every 6 hours PRN Shortness of Breath and/or Wheezing  dextrose Oral Gel 15 Gram(s) Oral once PRN Blood Glucose LESS THAN 70 milliGRAM(s)/deciliter  melatonin 3 milliGRAM(s) Oral at bedtime PRN Insomnia  oxyCODONE    IR 10 milliGRAM(s) Oral every 4 hours PRN Severe Pain (7 - 10)  oxyCODONE    IR 5 milliGRAM(s) Oral every 4 hours PRN Moderate Pain (4 - 6)    Vital Signs Last 24 Hrs  T(C): 37.2 (01-13-24 @ 22:31), Max: 37.2 (01-13-24 @ 22:31)  T(F): 98.9 (01-13-24 @ 22:31), Max: 98.9 (01-13-24 @ 22:31)  HR: 77 (01-13-24 @ 22:31) (75 - 91)  BP: 165/65 (01-13-24 @ 22:31) (151/78 - 170/63)  BP(mean): --  RR: 18 (01-13-24 @ 22:31) (18 - 18)  SpO2: 94% (01-13-24 @ 22:31) (94% - 99%)    Constitutional: No fever, fatigue  Skin: No rash.  Eyes: No recent vision problems or eye pain.  ENT: No congestion, ear pain, or sore throat.  Cardiovascular: No chest pain or palpation.  Respiratory: No cough, shortness of breath, congestion, or wheezing.  Gastrointestinal: No abdominal pain, nausea, vomiting, or diarrhea.  Genitourinary: No dysuria.  Musculoskeletal: No joint swelling.  Neurologic: No headache.    PHYSICAL EXAM:  GENERAL: NAD  EYES: EOMI, PERRLA  NECK: Supple, No JVD  CHEST/LUNG: dec breath sounds at bases  HEART:  S1 , S2 +  ABDOMEN: soft , bs+  EXTREMITIES:  left lower ext dec rom  NEUROLOGY:alert awake      LABS:        Creatinine Trend: 0.63 <--, 0.58 <--, 0.61 <--    Urine Studies:  Urinalysis Basic - ( 11 Jan 2024 06:00 )    Color:  / Appearance:  / SG:  / pH:   Gluc: 137 mg/dL / Ketone:   / Bili:  / Urobili:    Blood:  / Protein:  / Nitrite:    Leuk Esterase:  / RBC:  / WBC    Sq Epi:  / Non Sq Epi:  / Bacteria:                       RADIOLOGY & ADDITIONAL TESTS:    Imaging Personally Reviewed:    Consultant(s) Notes Reviewed:      Care Discussed with Consultants/Other Providers:

## 2024-01-14 LAB
GLUCOSE BLDC GLUCOMTR-MCNC: 158 MG/DL — HIGH (ref 70–99)
GLUCOSE BLDC GLUCOMTR-MCNC: 158 MG/DL — HIGH (ref 70–99)
GLUCOSE BLDC GLUCOMTR-MCNC: 161 MG/DL — HIGH (ref 70–99)
GLUCOSE BLDC GLUCOMTR-MCNC: 161 MG/DL — HIGH (ref 70–99)
GLUCOSE BLDC GLUCOMTR-MCNC: 173 MG/DL — HIGH (ref 70–99)
GLUCOSE BLDC GLUCOMTR-MCNC: 173 MG/DL — HIGH (ref 70–99)
GLUCOSE BLDC GLUCOMTR-MCNC: 200 MG/DL — HIGH (ref 70–99)
GLUCOSE BLDC GLUCOMTR-MCNC: 200 MG/DL — HIGH (ref 70–99)

## 2024-01-14 RX ORDER — HYDRALAZINE HCL 50 MG
10 TABLET ORAL ONCE
Refills: 0 | Status: COMPLETED | OUTPATIENT
Start: 2024-01-14 | End: 2024-01-14

## 2024-01-14 RX ORDER — AMLODIPINE BESYLATE 2.5 MG/1
5 TABLET ORAL DAILY
Refills: 0 | Status: DISCONTINUED | OUTPATIENT
Start: 2024-01-14 | End: 2024-01-16

## 2024-01-14 RX ADMIN — ENOXAPARIN SODIUM 40 MILLIGRAM(S): 100 INJECTION SUBCUTANEOUS at 05:56

## 2024-01-14 RX ADMIN — PANTOPRAZOLE SODIUM 40 MILLIGRAM(S): 20 TABLET, DELAYED RELEASE ORAL at 05:56

## 2024-01-14 RX ADMIN — MONTELUKAST 10 MILLIGRAM(S): 4 TABLET, CHEWABLE ORAL at 11:58

## 2024-01-14 RX ADMIN — Medication 975 MILLIGRAM(S): at 13:18

## 2024-01-14 RX ADMIN — POLYETHYLENE GLYCOL 3350 17 GRAM(S): 17 POWDER, FOR SOLUTION ORAL at 11:56

## 2024-01-14 RX ADMIN — Medication 10 MILLIGRAM(S): at 06:43

## 2024-01-14 RX ADMIN — Medication 975 MILLIGRAM(S): at 14:18

## 2024-01-14 RX ADMIN — VALSARTAN 320 MILLIGRAM(S): 80 TABLET ORAL at 05:56

## 2024-01-14 RX ADMIN — Medication 2: at 11:57

## 2024-01-14 RX ADMIN — Medication 2: at 07:44

## 2024-01-14 RX ADMIN — SENNA PLUS 2 TABLET(S): 8.6 TABLET ORAL at 22:01

## 2024-01-14 RX ADMIN — Medication 2: at 17:29

## 2024-01-14 NOTE — PROGRESS NOTE ADULT - SUBJECTIVE AND OBJECTIVE BOX
SUBJECTIVE / OVERNIGHT EVENTS:pt seen and examined  01-14-24    MEDICATIONS  (STANDING):  acetaminophen     Tablet .. 975 milliGRAM(s) Oral every 8 hours  dextrose 5%. 1000 milliLiter(s) (50 mL/Hr) IV Continuous <Continuous>  dextrose 5%. 1000 milliLiter(s) (100 mL/Hr) IV Continuous <Continuous>  dextrose 50% Injectable 25 Gram(s) IV Push once  dextrose 50% Injectable 25 Gram(s) IV Push once  dextrose 50% Injectable 12.5 Gram(s) IV Push once  enoxaparin Injectable 40 milliGRAM(s) SubCutaneous every 24 hours  glucagon  Injectable 1 milliGRAM(s) IntraMuscular once  insulin lispro (ADMELOG) corrective regimen sliding scale   SubCutaneous three times a day before meals  insulin lispro (ADMELOG) corrective regimen sliding scale   SubCutaneous at bedtime  montelukast 10 milliGRAM(s) Oral daily  pantoprazole    Tablet 40 milliGRAM(s) Oral before breakfast  polyethylene glycol 3350 17 Gram(s) Oral daily  senna 2 Tablet(s) Oral at bedtime  valsartan 320 milliGRAM(s) Oral daily    MEDICATIONS  (PRN):  albuterol    90 MICROgram(s) HFA Inhaler 2 Puff(s) Inhalation every 6 hours PRN Shortness of Breath and/or Wheezing  dextrose Oral Gel 15 Gram(s) Oral once PRN Blood Glucose LESS THAN 70 milliGRAM(s)/deciliter  melatonin 3 milliGRAM(s) Oral at bedtime PRN Insomnia  oxyCODONE    IR 10 milliGRAM(s) Oral every 4 hours PRN Severe Pain (7 - 10)  oxyCODONE    IR 5 milliGRAM(s) Oral every 4 hours PRN Moderate Pain (4 - 6)    Vital Signs Last 24 Hrs  T(C): 37.2 (01-14-24 @ 22:16), Max: 37.2 (01-14-24 @ 22:16)  T(F): 98.9 (01-14-24 @ 22:16), Max: 98.9 (01-14-24 @ 22:16)  HR: 83 (01-14-24 @ 22:16) (75 - 88)  BP: 174/73 (01-14-24 @ 22:16) (145/58 - 181/85)  BP(mean): --  RR: 18 (01-14-24 @ 22:16) (18 - 18)  SpO2: 98% (01-14-24 @ 22:16) (97% - 99%)    Constitutional: No fever, fatigue  Skin: No rash.  Eyes: No recent vision problems or eye pain.  ENT: No congestion, ear pain, or sore throat.  Cardiovascular: No chest pain or palpation.  Respiratory: No cough, shortness of breath, congestion, or wheezing.  Gastrointestinal: No abdominal pain, nausea, vomiting, or diarrhea.  Genitourinary: No dysuria.  Musculoskeletal: No joint swelling.  Neurologic: No headache.    PHYSICAL EXAM:  GENERAL: NAD  EYES: EOMI, PERRLA  NECK: Supple, No JVD  CHEST/LUNG: dec breath sounds at bases  HEART:  S1 , S2 +  ABDOMEN: soft , bs+  EXTREMITIES:  left lower ext dec rom  NEUROLOGY:alert awake    LABS:        Creatinine Trend: 0.63 <--, 0.58 <--, 0.61 <--    Urine Studies:  Urinalysis Basic - ( 11 Jan 2024 06:00 )    Color:  / Appearance:  / SG:  / pH:   Gluc: 137 mg/dL / Ketone:   / Bili:  / Urobili:    Blood:  / Protein:  / Nitrite:    Leuk Esterase:  / RBC:  / WBC    Sq Epi:  / Non Sq Epi:  / Bacteria:                                       RADIOLOGY & ADDITIONAL TESTS:    Imaging Personally Reviewed:    Consultant(s) Notes Reviewed:      Care Discussed with Consultants/Other Providers:

## 2024-01-14 NOTE — PROGRESS NOTE ADULT - SUBJECTIVE AND OBJECTIVE BOX
SUBJECTIVE  SBP >180 that came down with hydral x1, otherwise no acute events overnight. Pain controlled.    OBJECTIVE  Vital Signs Last 24 Hrs  T(C): 36.8 (14 Jan 2024 05:24), Max: 37.2 (13 Jan 2024 22:31)  T(F): 98.3 (14 Jan 2024 05:24), Max: 98.9 (13 Jan 2024 22:31)  HR: 82 (14 Jan 2024 07:00) (75 - 91)  BP: 169/67 (14 Jan 2024 07:00) (151/78 - 181/85)  RR: 18 (14 Jan 2024 07:00) (18 - 18)  SpO2: 98% (14 Jan 2024 07:00) (94% - 99%)  Parameters below as of 14 Jan 2024 07:00  Patient On (Oxygen Delivery Method): room air    PHYSICAL EXAM  Gen: Lying in bed, NAD  Resp: No increased WOB  LLE:  Dressings c/d/i, compartments soft  Motor: TA/EHL/GS/FHL intact  Sensory: DP/SP/Tib/Nela/Saph SILT  +DP pulse, WWP    LABS  No new labs    ASSESSMENT & PLAN  78yFemale s/p L hip IMN on 1/10/24.  -WBAT LLE  -pain control  -ice/cold compress  -incentive spirometry  -DVT ppx: Lovenox  -PT/OT  -dispo: [facility]

## 2024-01-15 LAB
GLUCOSE BLDC GLUCOMTR-MCNC: 182 MG/DL — HIGH (ref 70–99)
GLUCOSE BLDC GLUCOMTR-MCNC: 182 MG/DL — HIGH (ref 70–99)
GLUCOSE BLDC GLUCOMTR-MCNC: 192 MG/DL — HIGH (ref 70–99)
GLUCOSE BLDC GLUCOMTR-MCNC: 245 MG/DL — HIGH (ref 70–99)
GLUCOSE BLDC GLUCOMTR-MCNC: 245 MG/DL — HIGH (ref 70–99)
GLUCOSE BLDC GLUCOMTR-MCNC: 252 MG/DL — HIGH (ref 70–99)

## 2024-01-15 RX ADMIN — Medication 2: at 07:44

## 2024-01-15 RX ADMIN — VALSARTAN 320 MILLIGRAM(S): 80 TABLET ORAL at 05:47

## 2024-01-15 RX ADMIN — Medication 6: at 16:55

## 2024-01-15 RX ADMIN — Medication 975 MILLIGRAM(S): at 14:00

## 2024-01-15 RX ADMIN — Medication 975 MILLIGRAM(S): at 06:47

## 2024-01-15 RX ADMIN — Medication 975 MILLIGRAM(S): at 05:47

## 2024-01-15 RX ADMIN — ENOXAPARIN SODIUM 40 MILLIGRAM(S): 100 INJECTION SUBCUTANEOUS at 05:47

## 2024-01-15 RX ADMIN — AMLODIPINE BESYLATE 5 MILLIGRAM(S): 2.5 TABLET ORAL at 05:50

## 2024-01-15 RX ADMIN — Medication 4: at 11:41

## 2024-01-15 RX ADMIN — MONTELUKAST 10 MILLIGRAM(S): 4 TABLET, CHEWABLE ORAL at 11:55

## 2024-01-15 RX ADMIN — Medication 975 MILLIGRAM(S): at 13:18

## 2024-01-15 RX ADMIN — PANTOPRAZOLE SODIUM 40 MILLIGRAM(S): 20 TABLET, DELAYED RELEASE ORAL at 05:47

## 2024-01-15 RX ADMIN — POLYETHYLENE GLYCOL 3350 17 GRAM(S): 17 POWDER, FOR SOLUTION ORAL at 11:55

## 2024-01-15 NOTE — PROGRESS NOTE ADULT - SUBJECTIVE AND OBJECTIVE BOX
Pt seen/examined. Doing well. Pain controlled. No acute overnight complaints or events.    T(C): 36.9 (01-15-24 @ 05:40), Max: 37.2 (01-14-24 @ 22:16)  HR: 91 (01-15-24 @ 05:40) (79 - 91)  BP: 178/78 (01-15-24 @ 05:40) (145/58 - 178/78)  RR: 17 (01-15-24 @ 05:40) (16 - 18)  SpO2: 100% (01-15-24 @ 05:40) (97% - 100%)  Wt(kg): --    Gen: awake, alert, NAD  Resp: no increased work of breathing  LLE:  Dressing c/d/i  +EHL/FHL/TA/GS  SILT S/S/SP/DP  +DP Pulses  Compartments soft  No calf TTP     A/P: 77yo Female s/p left hip IMN on 1/10/24    - Pain control  - mechanical/DVT ppx  - OOB/PT  - WBAT LLE  - Dispo planning: [facility] Pt seen/examined. Doing well. Pain controlled. No acute overnight complaints or events.    T(C): 36.9 (01-15-24 @ 05:40), Max: 37.2 (01-14-24 @ 22:16)  HR: 91 (01-15-24 @ 05:40) (79 - 91)  BP: 178/78 (01-15-24 @ 05:40) (145/58 - 178/78)  RR: 17 (01-15-24 @ 05:40) (16 - 18)  SpO2: 100% (01-15-24 @ 05:40) (97% - 100%)  Wt(kg): --    Gen: awake, alert, NAD  Resp: no increased work of breathing  LLE:  Dressing c/d/i  +EHL/FHL/TA/GS  SILT S/S/SP/DP  +DP Pulses  Compartments soft  No calf TTP     A/P: 79yo Female s/p left hip IMN on 1/10/24    - Pain control  - mechanical/DVT ppx  - OOB/PT  - WBAT LLE  - Dispo planning: [facility]

## 2024-01-15 NOTE — PROGRESS NOTE ADULT - SUBJECTIVE AND OBJECTIVE BOX
SUBJECTIVE / OVERNIGHT EVENTS:pt seen and examined  01-15-24    MEDICATIONS  (STANDING):  acetaminophen     Tablet .. 975 milliGRAM(s) Oral every 8 hours  amLODIPine   Tablet 5 milliGRAM(s) Oral daily  dextrose 5%. 1000 milliLiter(s) (50 mL/Hr) IV Continuous <Continuous>  dextrose 5%. 1000 milliLiter(s) (100 mL/Hr) IV Continuous <Continuous>  dextrose 50% Injectable 25 Gram(s) IV Push once  dextrose 50% Injectable 12.5 Gram(s) IV Push once  dextrose 50% Injectable 25 Gram(s) IV Push once  enoxaparin Injectable 40 milliGRAM(s) SubCutaneous every 24 hours  glucagon  Injectable 1 milliGRAM(s) IntraMuscular once  insulin lispro (ADMELOG) corrective regimen sliding scale   SubCutaneous three times a day before meals  insulin lispro (ADMELOG) corrective regimen sliding scale   SubCutaneous at bedtime  montelukast 10 milliGRAM(s) Oral daily  pantoprazole    Tablet 40 milliGRAM(s) Oral before breakfast  polyethylene glycol 3350 17 Gram(s) Oral daily  senna 2 Tablet(s) Oral at bedtime  valsartan 320 milliGRAM(s) Oral daily    MEDICATIONS  (PRN):  albuterol    90 MICROgram(s) HFA Inhaler 2 Puff(s) Inhalation every 6 hours PRN Shortness of Breath and/or Wheezing  dextrose Oral Gel 15 Gram(s) Oral once PRN Blood Glucose LESS THAN 70 milliGRAM(s)/deciliter  melatonin 3 milliGRAM(s) Oral at bedtime PRN Insomnia  oxyCODONE    IR 10 milliGRAM(s) Oral every 4 hours PRN Severe Pain (7 - 10)  oxyCODONE    IR 5 milliGRAM(s) Oral every 4 hours PRN Moderate Pain (4 - 6)    Vital Signs Last 24 Hrs  T(C): 36.8 (01-15-24 @ 09:45), Max: 37.2 (01-14-24 @ 22:16)  T(F): 98.2 (01-15-24 @ 09:45), Max: 98.9 (01-14-24 @ 22:16)  HR: 92 (01-15-24 @ 09:45) (82 - 92)  BP: 148/77 (01-15-24 @ 09:45) (145/58 - 178/78)  BP(mean): --  RR: 18 (01-15-24 @ 09:45) (16 - 18)  SpO2: 99% (01-15-24 @ 09:45) (97% - 100%)      Constitutional: No fever, fatigue  Skin: No rash.  Eyes: No recent vision problems or eye pain.  ENT: No congestion, ear pain, or sore throat.  Cardiovascular: No chest pain or palpation.  Respiratory: No cough, shortness of breath, congestion, or wheezing.  Gastrointestinal: No abdominal pain, nausea, vomiting, or diarrhea.  Genitourinary: No dysuria.  Musculoskeletal: No joint swelling.  Neurologic: No headache.    PHYSICAL EXAM:  GENERAL: NAD  EYES: EOMI, PERRLA  NECK: Supple, No JVD  CHEST/LUNG: dec breath sounds at bases  HEART:  S1 , S2 +  ABDOMEN: soft , bs+  EXTREMITIES:  left lower ext dec rom  NEUROLOGY:alert awake    LABS:        Creatinine Trend: 0.63 <--, 0.58 <--, 0.61 <--    Urine Studies:  Urinalysis Basic - ( 11 Jan 2024 06:00 )    Color:  / Appearance:  / SG:  / pH:   Gluc: 137 mg/dL / Ketone:   / Bili:  / Urobili:    Blood:  / Protein:  / Nitrite:    Leuk Esterase:  / RBC:  / WBC    Sq Epi:  / Non Sq Epi:  / Bacteria:                               RADIOLOGY & ADDITIONAL TESTS:    Imaging Personally Reviewed:yes    Consultant(s) Notes Reviewed:  yes    Care Discussed with Consultants/Other Providers:yes

## 2024-01-16 VITALS
SYSTOLIC BLOOD PRESSURE: 146 MMHG | TEMPERATURE: 98 F | HEART RATE: 80 BPM | OXYGEN SATURATION: 97 % | DIASTOLIC BLOOD PRESSURE: 70 MMHG | RESPIRATION RATE: 16 BRPM

## 2024-01-16 LAB
GLUCOSE BLDC GLUCOMTR-MCNC: 159 MG/DL — HIGH (ref 70–99)
GLUCOSE BLDC GLUCOMTR-MCNC: 193 MG/DL — HIGH (ref 70–99)
GLUCOSE BLDC GLUCOMTR-MCNC: 209 MG/DL — HIGH (ref 70–99)

## 2024-01-16 RX ORDER — POLYETHYLENE GLYCOL 3350 17 G/17G
17 POWDER, FOR SOLUTION ORAL
Qty: 0 | Refills: 0 | DISCHARGE
Start: 2024-01-16

## 2024-01-16 RX ORDER — AMLODIPINE BESYLATE 2.5 MG/1
1 TABLET ORAL
Qty: 0 | Refills: 0 | DISCHARGE
Start: 2024-01-16

## 2024-01-16 RX ORDER — OXYCODONE HYDROCHLORIDE 5 MG/1
1 TABLET ORAL
Qty: 0 | Refills: 0 | DISCHARGE
Start: 2024-01-16

## 2024-01-16 RX ORDER — SENNA PLUS 8.6 MG/1
2 TABLET ORAL
Qty: 0 | Refills: 0 | DISCHARGE
Start: 2024-01-16

## 2024-01-16 RX ORDER — PANTOPRAZOLE SODIUM 20 MG/1
1 TABLET, DELAYED RELEASE ORAL
Qty: 0 | Refills: 0 | DISCHARGE
Start: 2024-01-16

## 2024-01-16 RX ORDER — ENOXAPARIN SODIUM 100 MG/ML
40 INJECTION SUBCUTANEOUS
Qty: 0 | Refills: 0 | DISCHARGE
Start: 2024-01-16

## 2024-01-16 RX ORDER — ACETAMINOPHEN 500 MG
3 TABLET ORAL
Qty: 0 | Refills: 0 | DISCHARGE
Start: 2024-01-16

## 2024-01-16 RX ADMIN — Medication 975 MILLIGRAM(S): at 14:06

## 2024-01-16 RX ADMIN — Medication 2: at 12:41

## 2024-01-16 RX ADMIN — ENOXAPARIN SODIUM 40 MILLIGRAM(S): 100 INJECTION SUBCUTANEOUS at 05:37

## 2024-01-16 RX ADMIN — Medication 975 MILLIGRAM(S): at 06:35

## 2024-01-16 RX ADMIN — PANTOPRAZOLE SODIUM 40 MILLIGRAM(S): 20 TABLET, DELAYED RELEASE ORAL at 05:38

## 2024-01-16 RX ADMIN — Medication 975 MILLIGRAM(S): at 05:38

## 2024-01-16 RX ADMIN — AMLODIPINE BESYLATE 5 MILLIGRAM(S): 2.5 TABLET ORAL at 05:38

## 2024-01-16 RX ADMIN — Medication 2: at 08:27

## 2024-01-16 RX ADMIN — MONTELUKAST 10 MILLIGRAM(S): 4 TABLET, CHEWABLE ORAL at 12:42

## 2024-01-16 RX ADMIN — VALSARTAN 320 MILLIGRAM(S): 80 TABLET ORAL at 12:42

## 2024-01-16 NOTE — PROGRESS NOTE ADULT - ASSESSMENT
A/P: 79yo Female s/p left hip IMN on 1/10/24    - Pain control  - mechanical/DVT ppx  - OOB/PT  - WBAT LLE  - Appreciate [facility] recs for BP control  - Dispo planning: [facility]   A/P: 77yo Female s/p left hip IMN on 1/10/24    - Pain control  - mechanical/DVT ppx  - OOB/PT  - WBAT LLE  - Appreciate [facility] recs for BP control  - Dispo planning: [facility]   WDL

## 2024-01-16 NOTE — PROGRESS NOTE ADULT - ATTENDING COMMENTS
Patient seen and examined with family at bedside. Wicho Prabhakar is a 78 year old female now status post Left Hip Intramedullary Nail. No acute events overnight.    Physical Exam:  Dressing: Clean, Dry Intact  Motor: Intact EHL/FHL/Tibialis Anterior/Gastrocnemius  Sensory: Intact Superficial Peroneal/Deep Peroneal/Saphenous/Sural/Tibial Nerves  Vascular: 2+ DP Pulse    Assessment/Plan:  Wicho Prabhakar is a 78 year old female now status post Left Hip Intramedullary Nail.    Plan:  -Left Lower Extremity: Weight Bearing as Tolerated  -PT/OT, Out of Bed  -Pain Control: Per Protocol  -DVT Prophylaxis: Lovenox  -Antibiotics: Ancef 2g x24 hours  -Disposition: [facility]

## 2024-01-16 NOTE — PROGRESS NOTE ADULT - PROVIDER SPECIALTY LIST ADULT
Internal Medicine
Internal Medicine
Orthopedics
Internal Medicine
Orthopedics
Internal Medicine

## 2024-01-16 NOTE — PROGRESS NOTE ADULT - SUBJECTIVE AND OBJECTIVE BOX
ORTHOPAEDICS DAILY PROGRESS NOTE:       SUBJECTIVE/ROS: POD 6. Seen and examined. Pain well controlled. Has been working with Pt. SBP 170s, remains on valsartan and amlodipine         MEDICATIONS  (STANDING):  acetaminophen     Tablet .. 975 milliGRAM(s) Oral every 8 hours  amLODIPine   Tablet 5 milliGRAM(s) Oral daily  dextrose 5%. 1000 milliLiter(s) (50 mL/Hr) IV Continuous <Continuous>  dextrose 5%. 1000 milliLiter(s) (100 mL/Hr) IV Continuous <Continuous>  dextrose 50% Injectable 25 Gram(s) IV Push once  dextrose 50% Injectable 25 Gram(s) IV Push once  dextrose 50% Injectable 12.5 Gram(s) IV Push once  enoxaparin Injectable 40 milliGRAM(s) SubCutaneous every 24 hours  glucagon  Injectable 1 milliGRAM(s) IntraMuscular once  insulin lispro (ADMELOG) corrective regimen sliding scale   SubCutaneous at bedtime  insulin lispro (ADMELOG) corrective regimen sliding scale   SubCutaneous three times a day before meals  montelukast 10 milliGRAM(s) Oral daily  pantoprazole    Tablet 40 milliGRAM(s) Oral before breakfast  polyethylene glycol 3350 17 Gram(s) Oral daily  senna 2 Tablet(s) Oral at bedtime  valsartan 320 milliGRAM(s) Oral daily    MEDICATIONS  (PRN):  albuterol    90 MICROgram(s) HFA Inhaler 2 Puff(s) Inhalation every 6 hours PRN Shortness of Breath and/or Wheezing  dextrose Oral Gel 15 Gram(s) Oral once PRN Blood Glucose LESS THAN 70 milliGRAM(s)/deciliter  melatonin 3 milliGRAM(s) Oral at bedtime PRN Insomnia  oxyCODONE    IR 10 milliGRAM(s) Oral every 4 hours PRN Severe Pain (7 - 10)  oxyCODONE    IR 5 milliGRAM(s) Oral every 4 hours PRN Moderate Pain (4 - 6)      OBJECTIVE:    Vital Signs Last 24 Hrs  T(C): 36.5 (16 Jan 2024 02:15), Max: 36.8 (15 Jose E 2024 09:45)  T(F): 97.7 (16 Jan 2024 02:15), Max: 98.2 (15 Jose E 2024 09:45)  HR: 88 (16 Jan 2024 02:15) (80 - 92)  BP: 178/73 (16 Jan 2024 02:15) (142/54 - 178/73)  BP(mean): --  RR: 16 (16 Jan 2024 02:15) (16 - 18)  SpO2: 95% (16 Jan 2024 02:15) (95% - 100%)    Parameters below as of 16 Jan 2024 02:15  Patient On (Oxygen Delivery Method): room air        I&O's Detail    14 Jan 2024 07:01  -  15 Jose E 2024 07:00  --------------------------------------------------------  IN:    Oral Fluid: 360 mL  Total IN: 360 mL    OUT:    Voided (mL): 800 mL  Total OUT: 800 mL    Total NET: -440 mL      15 Jose E 2024 07:01  -  16 Jan 2024 06:09  --------------------------------------------------------  IN:  Total IN: 0 mL    OUT:    Voided (mL): 201 mL  Total OUT: 201 mL    Total NET: -201 mL          Daily     Daily     LABS:                        PHYSICAL EXAM:  Gen: awake, alert, NAD  Resp: no increased work of breathing  LLE:  Dressing c/d/i  +EHL/FHL/TA/GS  SILT S/S/SP/DP  +DP Pulses  Compartments soft  No calf TTP

## 2024-01-16 NOTE — PROGRESS NOTE ADULT - REASON FOR ADMISSION
left IT fx

## 2024-02-06 ENCOUNTER — APPOINTMENT (OUTPATIENT)
Dept: ORTHOPEDIC SURGERY | Facility: CLINIC | Age: 79
End: 2024-02-06
Payer: COMMERCIAL

## 2024-02-06 PROCEDURE — 99024 POSTOP FOLLOW-UP VISIT: CPT

## 2024-02-06 PROCEDURE — 73502 X-RAY EXAM HIP UNI 2-3 VIEWS: CPT | Mod: 26

## 2024-02-06 NOTE — DISCUSSION/SUMMARY
[de-identified] : Wicho Prabhakar is a 78-year-old female who presents to the office for follow-up of her left hip intramedullary nail.  Patient underwent left hip intramedullary nail 1/10/2024.  She is currently doing well.  X-rays showed left hip intramedullary nail in good position and alignment.  Examination showed good left hip range of motion.  Discussed with the patient and her family the examination and imaging findings.  Discussed the management of her left hip intramedullary nail at this time, including physical therapy and DVT prophylaxis.  Patient will continue her physical therapy at rehab.  She was given referral for outpatient physical therapy.  Patient will continue to take her Lovenox 40 mg daily for total of 6 weeks for her DVT prophylaxis.  Patient will continue be weightbearing as tolerated on the left lower extremity.  Discussed that patient may shower, but should not soak the wound.  Patient will follow-up in 4 weeks for reevaluation and management.  Patient and her family understanding and in agreement with the plan.  All questions answered.  Plan: -Physical therapy -DVT prophylaxis: Lovenox 40 mg daily for a total of 6 weeks -Left lower extremity: Weightbearing as tolerated -Patient may shower, but should not soak the wound -Follow-up in 4 weeks for reevaluation and management

## 2024-02-06 NOTE — HISTORY OF PRESENT ILLNESS
[de-identified] : Interpreted by Family  Wicho Prabhakar is a 78-year-old female presents to the office for follow-up of her left hip intramedullary nail.  Patient underwent left hip intramedullary nail on 1/10/2024 for left intertrochanteric hip fracture.  Patient is currently at [place].  She is currently doing well.  Patient has been walking with a walker.  No falls.  No fevers or chills.  She has been taking her Lovenox.  History: Diabetes, HTN

## 2024-02-06 NOTE — PHYSICAL EXAM
[de-identified] : Constitutional:  78 year old female, alert and oriented, cooperative, in no acute distress.  HEENT  NC/AT.  Appearance: symmetric  Neck/Back Straight without deformity or instability.  Good ROM.  Chest/Respiratory  Respiratory effort: no intercostal retractions or use of accessory muscles. Nonlabored Breathing  Mental Status:  Judgment, insight: intact Orientation: oriented to time, place, and person  Neurological: Sensory and Motor are grossly intact throughout  Left Hip Exam: Inspection/Appearance:      Incision well healing, no erythema or drainage. One Nylon suture removed  Tenderness:   	Greater trochanter: Negative                  KENTRELL Test: Negative                 FADIR Test: Negative  Range of Motion:                 Extension - 0  	Flexion - 100 	IR - 20  	ER - 30 	Abd - 30  	Add - 30   Stability: Normal without instability  Neurologic Exam     Motor intact including 5/5 Extensor Hallucis Longus, 5/5 Flexor Hallucis Longus, 5/5 Tibialis Anterior and 5/5 Gastrocnemius     Sensation Intact to Light Touch including Saphenous, Sural, Superficial Peroneal, Deep Peroneal, Tibial nerve distributions  Vascular Exam     Foot is warm and well perfused with 2+ Dorsalis Pedis Pulse  No pain with range of motion of the right hip or bilateral knees. No lumbar paraspinal muscle tenderness.  [de-identified] : XRay:  XRays of the Pelvis (1 View) and Left Hip (2 Views) taken in the office today and reviewed with the patient. XRays demonstrate a Left hip intramedullary nail in good position and alignment.  There is no obvious evidence of hardware failure or loosening.  (my personal interpretation) Components: [place] Gamma4 Nail

## 2024-03-07 ENCOUNTER — APPOINTMENT (OUTPATIENT)
Dept: ORTHOPEDIC SURGERY | Facility: CLINIC | Age: 79
End: 2024-03-07
Payer: MEDICARE

## 2024-03-07 PROCEDURE — 73502 X-RAY EXAM HIP UNI 2-3 VIEWS: CPT

## 2024-03-07 PROCEDURE — 99024 POSTOP FOLLOW-UP VISIT: CPT

## 2024-03-07 NOTE — PHYSICAL EXAM
[de-identified] : Constitutional:  78 year old female, alert and oriented, cooperative, in no acute distress.  HEENT  NC/AT.  Appearance: symmetric  Neck/Back Straight without deformity or instability.  Good ROM.  Chest/Respiratory  Respiratory effort: no intercostal retractions or use of accessory muscles. Nonlabored Breathing  Mental Status:  Judgment, insight: intact Orientation: oriented to time, place, and person  Neurological: Sensory and Motor are grossly intact throughout  Left Hip Exam: Inspection/Appearance:      Incision well healed, no erythema or drainage.   Tenderness:   	Greater trochanter: Negative                  KENTRELL Test: Negative                 FADIR Test: Negative  Range of Motion:                 Extension - 0  	Flexion - 100 	IR - 20  	ER - 30 	Abd - 30  	Add - 30   Stability: Normal without instability  Neurologic Exam     Motor intact including 5/5 Extensor Hallucis Longus, 5/5 Flexor Hallucis Longus, 5/5 Tibialis Anterior and 5/5 Gastrocnemius     Sensation Intact to Light Touch including Saphenous, Sural, Superficial Peroneal, Deep Peroneal, Tibial nerve distributions  Vascular Exam     Foot is warm and well perfused with 2+ Dorsalis Pedis Pulse  No pain with range of motion of the right hip or bilateral knees. No lumbar paraspinal muscle tenderness.  [de-identified] : XRay:  XRays of the Pelvis (1 View) and Left Hip (2 Views) taken in the office today and reviewed with the patient. XRays demonstrate a Left hip intramedullary nail in good position and alignment.  There is no obvious evidence of hardware failure or loosening.  (my personal interpretation) Components: [place] Gamma4 Nail

## 2024-03-07 NOTE — HISTORY OF PRESENT ILLNESS
[de-identified] : 3/7/2024 Interpreted by Son  Wicho Prabhakar presents to the office for follow-up of her left hip intramedullary nail.  Patient underwent left hip intramedullary nail in 1/10/2024 for left intertrochanteric hip fracture.  Patient is currently doing well overall.  She has been walking with a walker and has tried a cane.  No falls.  No fevers or chills.  Patient continues to have significant left knee pain.  She has a history of left knee osteoarthritis.  She would like to consider left total knee arthroplasty.  Patient has a history of a right TKA.  2/6/2024 Interpreted by Family Wicho Prabhakar is a 78-year-old female presents to the office for follow-up of her left hip intramedullary nail.  Patient underwent left hip intramedullary nail on 1/10/2024 for left intertrochanteric hip fracture.  Patient is currently at [place].  She is currently doing well.  Patient has been walking with a walker.  No falls.  No fevers or chills.  She has been taking her Lovenox.  History: Diabetes, HTN

## 2024-03-07 NOTE — DISCUSSION/SUMMARY
[de-identified] : Wicho Prabhakar is a 78-year-old female who presents to the office for follow-up of her left hip intramedullary nail.  Patient underwent left hip intramedullary nail 1/10/2024.  She is currently doing well.  X-rays showed left hip intramedullary nail in good position and alignment.  Examination showed good left hip range of motion.  Discussed with the patient and her family the examination and imaging findings.  Discussed the management of her left hip intramedullary nail at this time, including physical therapy.  Patient will continue her physical therapy.  Patient will continue be weightbearing as tolerated on the left lower extremity.  Patient has completed her DVT prophylaxis.  Discussed patient's left knee osteoarthritis.  Discussed continuing to recover from hip surgery prior to left TKA.  Patient will follow-up in 6 weeks for reevaluation and management, consideration of left TKA.  Patient and her family understanding and in agreement with the plan.  All questions answered.  Plan: -Physical therapy -DVT prophylaxis: Completed -Left lower extremity: Weightbearing as tolerated -Left Knee XRays at next visit -Follow-up in 6 weeks for reevaluation and management, consideration of left TKA

## 2024-03-13 NOTE — ED ADULT TRIAGE NOTE - HEART RATE (BEATS/MIN)
exercises and manual. Pt reported no pain post tx.     Pain after treatment:    0/10    Prognosis: [x] Good [] Fair  [] Poor    Patient Requires Follow-up: [x] Yes  [] No    Plan:   [x] Continue per plan of care [] Alter current plan (see comments)  [] Plan of care initiated [] Hold pending MD visit [] Discharge    Plan for Next Session:        Electronically signed by:  Carlyle Lopez PT           94

## 2024-04-17 PROBLEM — M17.12 PRIMARY OSTEOARTHRITIS OF LEFT KNEE: Status: ACTIVE | Noted: 2024-04-17

## 2024-04-18 ENCOUNTER — APPOINTMENT (OUTPATIENT)
Dept: ORTHOPEDIC SURGERY | Facility: CLINIC | Age: 79
End: 2024-04-18
Payer: MEDICARE

## 2024-04-18 VITALS
BODY MASS INDEX: 26.06 KG/M2 | HEIGHT: 61 IN | DIASTOLIC BLOOD PRESSURE: 77 MMHG | OXYGEN SATURATION: 90 % | HEART RATE: 81 BPM | WEIGHT: 138 LBS | SYSTOLIC BLOOD PRESSURE: 145 MMHG

## 2024-04-18 DIAGNOSIS — M17.12 UNILATERAL PRIMARY OSTEOARTHRITIS, LEFT KNEE: ICD-10-CM

## 2024-04-18 DIAGNOSIS — S72.142A DISPLACED INTERTROCHANTERIC FRACTURE OF LEFT FEMUR, INITIAL ENCOUNTER FOR CLOSED FRACTURE: ICD-10-CM

## 2024-04-18 PROCEDURE — 99213 OFFICE O/P EST LOW 20 MIN: CPT

## 2024-04-18 PROCEDURE — 73564 X-RAY EXAM KNEE 4 OR MORE: CPT | Mod: LT

## 2024-04-18 RX ORDER — MELOXICAM 15 MG/1
15 TABLET ORAL DAILY
Qty: 14 | Refills: 1 | Status: ACTIVE | COMMUNITY
Start: 2024-04-18 | End: 1900-01-01

## 2024-04-21 PROBLEM — S72.142A INTERTROCHANTERIC FRACTURE OF LEFT HIP: Status: ACTIVE | Noted: 2024-02-03

## 2024-04-21 NOTE — HISTORY OF PRESENT ILLNESS
[de-identified] : 4/18/2024 Interpreted by Devan Prabhakar presented to the office for follow-up of her left hip intramedullary nail.  Patient underwent left hip IMN on 1/10/2024.  Patient's left hip is currently doing well.  She continues to have left knee pain.  Patient has tried meloxicam, with some relief.  She tried an injection about 2 to 3 years ago, without significant relief.  Patient has a history of a right TKA about 2 years ago.  She has tried physical therapy, some relief.  No falls.  No fevers or chills.  3/7/2024 Interpreted by Devan Prabhakar presents to the office for follow-up of her left hip intramedullary nail.  Patient underwent left hip intramedullary nail in 1/10/2024 for left intertrochanteric hip fracture.  Patient is currently doing well overall.  She has been walking with a walker and has tried a cane.  No falls.  No fevers or chills.  Patient continues to have significant left knee pain.  She has a history of left knee osteoarthritis.  She would like to consider left total knee arthroplasty.  Patient has a history of a right TKA.  2/6/2024 Interpreted by Family [place] is a 78-year-old female presents to the office for follow-up of her left hip intramedullary nail.  Patient underwent left hip intramedullary nail on 1/10/2024 for left intertrochanteric hip fracture.  Patient is currently at [place].  She is currently doing well.  Patient has been walking with a walker.  No falls.  No fevers or chills.  She has been taking her Lovenox.  History: Diabetes, HTN

## 2024-04-21 NOTE — REVIEW OF SYSTEMS
[Joint Pain] : no joint pain [Joint Stiffness] : no joint stiffness [Joint Swelling] : no joint swelling [Fever] : no fever [Chills] : no chills

## 2024-04-21 NOTE — PHYSICAL EXAM
[de-identified] : Constitutional:  78 year old female, alert and oriented, cooperative, in no acute distress.  HEENT  NC/AT.  Appearance: symmetric  Neck/Back Straight without deformity or instability.  Good ROM.  Chest/Respiratory  Respiratory effort: no intercostal retractions or use of accessory muscles. Nonlabored Breathing  Mental Status:  Judgment, insight: intact Orientation: oriented to time, place, and person  Neurological: Sensory and Motor are grossly intact throughout  Left Hip Exam: Inspection/Appearance:      Incision well healed, no erythema or drainage.   Tenderness:   	Greater trochanter: Negative                  KENTRELL Test: Negative                 FADIR Test: Negative  Range of Motion:                 Extension - 0  	Flexion - 100 	IR - 30  	ER - 40 	Abd - 40  	Add - 30   Stability: Normal without instability  Neurologic Exam     Motor intact including 5/5 Extensor Hallucis Longus, 5/5 Flexor Hallucis Longus, 5/5 Tibialis Anterior and 5/5 Gastrocnemius     Sensation Intact to Light Touch including Saphenous, Sural, Superficial Peroneal, Deep Peroneal, Tibial nerve distributions  Vascular Exam     Foot is warm and well perfused with 2+ Dorsalis Pedis Pulse  Left Knee  Inspection:     Skin intact, no rashes or lesions     No Effusion     Non-tender to palpation over tibial tubercle, patella, medial and lateral joint line, and pes insertion.  Range of Motion: 	Extension - 0 degrees 	Flexion - 120 degrees 	Extensor lag: None  Stability:      Demonstrates no Varus or Valgus instability      Negative Anterior or Posterior drawer.      Negative Lachman's  Patella: stable, tracks well. [de-identified] : XRay:  XRays of the Pelvis (1 View) and Left Hip (2 Views) taken on 3/7/2024. XRays demonstrate a Left hip intramedullary nail in good position and alignment.  There is no obvious evidence of hardware failure or loosening.  (my personal interpretation) Components: [place] Gamma4 Nail  XRay: XRays of the Left Knee (4 Views) taken in the office today and reviewed with the patient. XRays demonstrate tricompartmental joint space narrowing, with bone on bone articulations in the medial compartment, with subchondral sclerosis, overlying osteophytes, all consistent with severe osteoarthritis, KL thGthrthathdtheth:th th5th. There is varus alignment. (my personal interpretation)

## 2024-04-21 NOTE — DISCUSSION/SUMMARY
[de-identified] : Wicho Prabhakar is a 78-year-old female who presents to the office for follow-up of her left hip intramedullary nail and left knee osteoarthritis.  Patient underwent left hip intramedullary nail 1/10/2024.  She is currently doing well.  X-rays showed left hip intramedullary nail in good position and alignment.  Examination showed good left hip range of motion.  Discussed with the patient and her family the examination and imaging findings.  Discussed the management of her left hip intramedullary nail at this time, including physical therapy.  Patient will continue her physical therapy.  Patient will continue be weightbearing as tolerated on the left lower extremity. Discussed patient's left knee osteoarthritis. Discussed the operative and nonoperative management of knee osteoarthritis, including total knee arthroplasty.  Patient would like to continue nonoperative management at this time.  Discussed the nonoperative management of knee osteoarthritis, including physical therapy and anti-inflammatories.  Patient was given a referral to physical therapy.  She was given a prescription for meloxicam 15 mg daily for 14 days.  Patient will follow-up in 2 months for reevaluation and management.  Patient understanding and in agreement with the plan.  All questions answered.  Plan: -Physical therapy -Meloxicam 15mg daily for 14 days -Follow-up in 2 months for reevaluation and management

## 2024-06-19 NOTE — DISCUSSION/SUMMARY
[de-identified] : Wicho Prabhakar is a 78-year-old female who presents to the office for follow-up of her left hip intramedullary nail and left knee osteoarthritis.  Patient underwent left hip intramedullary nail 1/10/2024.  She is currently doing well.  X-rays showed left hip intramedullary nail in good position and alignment.  Examination showed good left hip range of motion.  Discussed with the patient and her family the examination and imaging findings.  Discussed the management of her left hip intramedullary nail at this time, including physical therapy.  Patient will continue her physical therapy.  Patient will continue be weightbearing as tolerated on the left lower extremity. Discussed patient's left knee osteoarthritis. Discussed the operative and nonoperative management of knee osteoarthritis, including total knee arthroplasty.  Patient would like to continue nonoperative management at this time.  Discussed the nonoperative management of knee osteoarthritis, including physical therapy and anti-inflammatories.  Patient was given a referral to physical therapy.  She was given a prescription for meloxicam 15 mg daily for 14 days.  Patient will follow-up in 2 months for reevaluation and management.  Patient understanding and in agreement with the plan.  All questions answered.  Plan: -Physical therapy -Meloxicam 15mg daily for 14 days -Follow-up in 2 months for reevaluation and management

## 2024-06-19 NOTE — PHYSICAL EXAM
[de-identified] : Constitutional:  78 year old female, alert and oriented, cooperative, in no acute distress.  HEENT  NC/AT.  Appearance: symmetric  Neck/Back Straight without deformity or instability.  Good ROM.  Chest/Respiratory  Respiratory effort: no intercostal retractions or use of accessory muscles. Nonlabored Breathing  Mental Status:  Judgment, insight: intact Orientation: oriented to time, place, and person  Neurological: Sensory and Motor are grossly intact throughout  Left Hip Exam: Inspection/Appearance:      Incision well healed, no erythema or drainage.   Tenderness:   	Greater trochanter: Negative                  KENTRELL Test: Negative                 FADIR Test: Negative  Range of Motion:                 Extension - 0  	Flexion - 100 	IR - 30  	ER - 40 	Abd - 40  	Add - 30   Stability: Normal without instability  Neurologic Exam     Motor intact including 5/5 Extensor Hallucis Longus, 5/5 Flexor Hallucis Longus, 5/5 Tibialis Anterior and 5/5 Gastrocnemius     Sensation Intact to Light Touch including Saphenous, Sural, Superficial Peroneal, Deep Peroneal, Tibial nerve distributions  Vascular Exam     Foot is warm and well perfused with 2+ Dorsalis Pedis Pulse  Left Knee  Inspection:     Skin intact, no rashes or lesions     No Effusion     Non-tender to palpation over tibial tubercle, patella, medial and lateral joint line, and pes insertion.  Range of Motion: 	Extension - 0 degrees 	Flexion - 120 degrees 	Extensor lag: None  Stability:      Demonstrates no Varus or Valgus instability      Negative Anterior or Posterior drawer.      Negative Lachman's  Patella: stable, tracks well. [de-identified] : XRay:  XRays of the Pelvis (1 View) and Left Hip (2 Views) taken on 3/7/2024. XRays demonstrate a Left hip intramedullary nail in good position and alignment.  There is no obvious evidence of hardware failure or loosening.  (my personal interpretation) Components: [place] Gamma4 Nail  XRay: XRays of the Left Knee (4 Views) taken in the office today and reviewed with the patient. XRays demonstrate tricompartmental joint space narrowing, with bone on bone articulations in the medial compartment, with subchondral sclerosis, overlying osteophytes, all consistent with severe osteoarthritis, KL thGthrthathdtheth:th th5th. There is varus alignment. (my personal interpretation)

## 2024-06-19 NOTE — HISTORY OF PRESENT ILLNESS
[de-identified] : 6/20/2024 4/18/2024 Interpreted by Devan  Wicho Prabhakar presented to the office for follow-up of her left hip intramedullary nail.  Patient underwent left hip IMN on 1/10/2024.  Patient's left hip is currently doing well.  She continues to have left knee pain.  Patient has tried meloxicam, with some relief.  She tried an injection about 2 to 3 years ago, without significant relief.  Patient has a history of a right TKA about 2 years ago.  She has tried physical therapy, some relief.  No falls.  No fevers or chills.  3/7/2024 Interpreted by Devan Prabhakar presents to the office for follow-up of her left hip intramedullary nail.  Patient underwent left hip intramedullary nail in 1/10/2024 for left intertrochanteric hip fracture.  Patient is currently doing well overall.  She has been walking with a walker and has tried a cane.  No falls.  No fevers or chills.  Patient continues to have significant left knee pain.  She has a history of left knee osteoarthritis.  She would like to consider left total knee arthroplasty.  Patient has a history of a right TKA.  2/6/2024 Interpreted by   Wicho Prabhakar is a 78-year-old female presents to the office for follow-up of her left hip intramedullary nail.  Patient underwent left hip intramedullary nail on 1/10/2024 for left intertrochanteric hip fracture.  Patient is currently at [facility].  She is currently doing well.  Patient has been walking with a walker.  No falls.  No fevers or chills.  She has been taking her Lovenox.  History: Diabetes, HTN

## 2024-06-20 ENCOUNTER — APPOINTMENT (OUTPATIENT)
Dept: ORTHOPEDIC SURGERY | Facility: CLINIC | Age: 79
End: 2024-06-20

## 2024-06-24 ENCOUNTER — APPOINTMENT (OUTPATIENT)
Dept: PULMONOLOGY | Facility: CLINIC | Age: 79
End: 2024-06-24
Payer: MEDICARE

## 2024-06-24 VITALS
BODY MASS INDEX: 24.55 KG/M2 | OXYGEN SATURATION: 92 % | TEMPERATURE: 98.2 F | DIASTOLIC BLOOD PRESSURE: 70 MMHG | RESPIRATION RATE: 15 BRPM | HEART RATE: 76 BPM | SYSTOLIC BLOOD PRESSURE: 151 MMHG | HEIGHT: 61 IN | WEIGHT: 130 LBS

## 2024-06-24 PROCEDURE — 99213 OFFICE O/P EST LOW 20 MIN: CPT

## 2024-07-01 DIAGNOSIS — D72.829 ELEVATED WHITE BLOOD CELL COUNT, UNSPECIFIED: ICD-10-CM

## 2024-07-01 PROBLEM — J84.9 INTERSTITIAL LUNG DISEASE: Status: ACTIVE | Noted: 2024-06-24

## 2024-07-01 LAB
BASOPHILS # BLD AUTO: 0.1 K/UL
BASOPHILS NFR BLD AUTO: 0.8 %
CCP AB SER IA-ACNC: <8 UNITS
CRP SERPL-MCNC: <3 MG/L
DSDNA AB SER-ACNC: 1 IU/ML
ENA JO1 AB SER IA-ACNC: <0.2 AL
ENA SCL70 IGG SER IA-ACNC: <0.2 AL
ENA SS-A AB SER IA-ACNC: <0.2 AL
ENA SS-B AB SER IA-ACNC: <0.2 AL
EOSINOPHIL # BLD AUTO: 0.21 K/UL
EOSINOPHIL NFR BLD AUTO: 1.6 %
ERYTHROCYTE [SEDIMENTATION RATE] IN BLOOD BY WESTERGREN METHOD: 57 MM/HR
HCT VFR BLD CALC: 36.1 %
HGB BLD-MCNC: 11.5 G/DL
IMM GRANULOCYTES NFR BLD AUTO: 1.1 %
LYMPHOCYTES # BLD AUTO: 3.42 K/UL
LYMPHOCYTES NFR BLD AUTO: 25.9 %
MAN DIFF?: NORMAL
MCHC RBC-ENTMCNC: 25.5 PG
MCHC RBC-ENTMCNC: 31.9 GM/DL
MCV RBC AUTO: 80 FL
MONOCYTES # BLD AUTO: 0.8 K/UL
MONOCYTES NFR BLD AUTO: 6.1 %
NEUTROPHILS # BLD AUTO: 8.54 K/UL
NEUTROPHILS NFR BLD AUTO: 64.5 %
PLATELET # BLD AUTO: 491 K/UL
RBC # BLD: 4.51 M/UL
RBC # FLD: 15.4 %
RF+CCP IGG SER-IMP: NEGATIVE
RHEUMATOID FACT SER QL: 10 IU/ML
WBC # FLD AUTO: 13.21 K/UL

## 2024-07-02 ENCOUNTER — LABORATORY RESULT (OUTPATIENT)
Age: 79
End: 2024-07-02

## 2024-07-02 LAB — ANA SER IF-ACNC: NEGATIVE

## 2024-07-03 LAB
ALBUMIN SERPL ELPH-MCNC: 4.1 G/DL
ALP BLD-CCNC: 93 U/L
ALT SERPL-CCNC: 11 U/L
ANION GAP SERPL CALC-SCNC: 11 MMOL/L
APPEARANCE: CLEAR
AST SERPL-CCNC: 14 U/L
BASOPHILS # BLD AUTO: 0.08 K/UL
BASOPHILS NFR BLD AUTO: 0.7 %
BILIRUB SERPL-MCNC: <0.2 MG/DL
BILIRUBIN URINE: NEGATIVE
BLOOD URINE: NEGATIVE
BUN SERPL-MCNC: 11 MG/DL
CALCIUM SERPL-MCNC: 9.5 MG/DL
CHLORIDE SERPL-SCNC: 100 MMOL/L
CO2 SERPL-SCNC: 26 MMOL/L
COLOR: YELLOW
CREAT SERPL-MCNC: 0.7 MG/DL
EGFR: 90 ML/MIN/1.73M2
EOSINOPHIL # BLD AUTO: 0.2 K/UL
EOSINOPHIL NFR BLD AUTO: 1.7 %
GLUCOSE QUALITATIVE U: NEGATIVE MG/DL
GLUCOSE SERPL-MCNC: 156 MG/DL
HCT VFR BLD CALC: 36.9 %
HGB BLD-MCNC: 11.2 G/DL
IMM GRANULOCYTES NFR BLD AUTO: 0.6 %
KETONES URINE: NEGATIVE MG/DL
LEUKOCYTE ESTERASE URINE: ABNORMAL
LYMPHOCYTES # BLD AUTO: 3.63 K/UL
LYMPHOCYTES NFR BLD AUTO: 30.6 %
MAN DIFF?: NORMAL
MCHC RBC-ENTMCNC: 24.9 PG
MCHC RBC-ENTMCNC: 30.4 GM/DL
MCV RBC AUTO: 82.2 FL
MONOCYTES # BLD AUTO: 1 K/UL
MONOCYTES NFR BLD AUTO: 8.4 %
NEUTROPHILS # BLD AUTO: 6.87 K/UL
NEUTROPHILS NFR BLD AUTO: 58 %
NITRITE URINE: NEGATIVE
PH URINE: 6.5
PLATELET # BLD AUTO: 379 K/UL
POTASSIUM SERPL-SCNC: 5.1 MMOL/L
PROT SERPL-MCNC: 7 G/DL
PROTEIN URINE: NEGATIVE MG/DL
RBC # BLD: 4.49 M/UL
RBC # FLD: 15.7 %
SODIUM SERPL-SCNC: 137 MMOL/L
SPECIFIC GRAVITY URINE: 1.01
UROBILINOGEN URINE: 0.2 MG/DL
WBC # FLD AUTO: 11.85 K/UL

## 2024-07-05 LAB — BACTERIA UR CULT: NORMAL

## 2024-07-08 LAB — BACTERIA BLD CULT: NORMAL

## 2024-07-16 ENCOUNTER — APPOINTMENT (OUTPATIENT)
Dept: PULMONOLOGY | Facility: CLINIC | Age: 79
End: 2024-07-16
Payer: MEDICARE

## 2024-07-16 VITALS
HEART RATE: 86 BPM | TEMPERATURE: 98 F | WEIGHT: 132 LBS | BODY MASS INDEX: 24.92 KG/M2 | RESPIRATION RATE: 15 BRPM | OXYGEN SATURATION: 88 % | HEIGHT: 61 IN | DIASTOLIC BLOOD PRESSURE: 63 MMHG | SYSTOLIC BLOOD PRESSURE: 145 MMHG

## 2024-07-16 DIAGNOSIS — J84.9 INTERSTITIAL PULMONARY DISEASE, UNSPECIFIED: ICD-10-CM

## 2024-07-16 PROCEDURE — 99213 OFFICE O/P EST LOW 20 MIN: CPT

## 2024-07-18 ENCOUNTER — APPOINTMENT (OUTPATIENT)
Dept: ORTHOPEDIC SURGERY | Facility: CLINIC | Age: 79
End: 2024-07-18
Payer: MEDICARE

## 2024-07-18 DIAGNOSIS — S72.142A DISPLACED INTERTROCHANTERIC FRACTURE OF LEFT FEMUR, INITIAL ENCOUNTER FOR CLOSED FRACTURE: ICD-10-CM

## 2024-07-18 DIAGNOSIS — M17.12 UNILATERAL PRIMARY OSTEOARTHRITIS, LEFT KNEE: ICD-10-CM

## 2024-07-18 PROCEDURE — 99213 OFFICE O/P EST LOW 20 MIN: CPT

## 2024-08-06 ENCOUNTER — NON-APPOINTMENT (OUTPATIENT)
Age: 79
End: 2024-08-06

## 2024-08-06 ENCOUNTER — APPOINTMENT (OUTPATIENT)
Dept: OPHTHALMOLOGY | Facility: CLINIC | Age: 79
End: 2024-08-06

## 2024-08-06 PROCEDURE — 92015 DETERMINE REFRACTIVE STATE: CPT | Mod: NC

## 2024-08-06 PROCEDURE — 92250 FUNDUS PHOTOGRAPHY W/I&R: CPT

## 2024-08-06 PROCEDURE — 92004 COMPRE OPH EXAM NEW PT 1/>: CPT

## 2024-08-06 PROCEDURE — 92286 ANT SGM IMG I&R SPECLR MIC: CPT

## 2024-08-17 ENCOUNTER — NON-APPOINTMENT (OUTPATIENT)
Age: 79
End: 2024-08-17

## 2024-08-17 ENCOUNTER — APPOINTMENT (OUTPATIENT)
Dept: OPHTHALMOLOGY | Facility: CLINIC | Age: 79
End: 2024-08-17
Payer: MEDICARE

## 2024-08-17 PROCEDURE — 92134 CPTRZ OPH DX IMG PST SGM RTA: CPT

## 2024-08-17 PROCEDURE — 92012 INTRM OPH EXAM EST PATIENT: CPT

## 2024-11-14 ENCOUNTER — APPOINTMENT (OUTPATIENT)
Dept: ORTHOPEDIC SURGERY | Facility: CLINIC | Age: 79
End: 2024-11-14
Payer: MEDICARE

## 2024-11-14 DIAGNOSIS — M17.12 UNILATERAL PRIMARY OSTEOARTHRITIS, LEFT KNEE: ICD-10-CM

## 2024-11-14 PROCEDURE — 99214 OFFICE O/P EST MOD 30 MIN: CPT | Mod: 25

## 2024-11-14 PROCEDURE — 20610 DRAIN/INJ JOINT/BURSA W/O US: CPT | Mod: LT

## 2025-01-23 ENCOUNTER — APPOINTMENT (OUTPATIENT)
Dept: ORTHOPEDIC SURGERY | Facility: CLINIC | Age: 80
End: 2025-01-23

## 2025-02-20 ENCOUNTER — APPOINTMENT (OUTPATIENT)
Dept: ORTHOPEDIC SURGERY | Facility: CLINIC | Age: 80
End: 2025-02-20
Payer: MEDICARE

## 2025-02-20 DIAGNOSIS — M17.12 UNILATERAL PRIMARY OSTEOARTHRITIS, LEFT KNEE: ICD-10-CM

## 2025-02-20 PROCEDURE — 73564 X-RAY EXAM KNEE 4 OR MORE: CPT | Mod: LT

## 2025-02-20 PROCEDURE — 99213 OFFICE O/P EST LOW 20 MIN: CPT

## 2025-05-01 NOTE — ED ADULT NURSE NOTE - IS THE PATIENT ABLE TO BE SCREENED?
Spoke with patient and told her results have not been reviewed yet by PCP. Will call once reviewed.    Yes

## 2025-05-27 ENCOUNTER — APPOINTMENT (OUTPATIENT)
Dept: ORTHOPEDIC SURGERY | Facility: CLINIC | Age: 80
End: 2025-05-27

## (undated) DEVICE — DRSG WEBRIL 4"

## (undated) DEVICE — DRSG COBAN 4" LF NONSTERILE

## (undated) DEVICE — SUT VICRYL 2-0 27" FS-1 UNDYED

## (undated) DEVICE — SOL IRR POUR NS 0.9% 1500ML

## (undated) DEVICE — TAPE SILK 3"

## (undated) DEVICE — PREP CHLORAPREP HI-LITE ORANGE 26ML

## (undated) DEVICE — SUT VICRYL 1 36" CTX UNDYED

## (undated) DEVICE — LIJ-GAMMA NAIL OPTIONAL INST TRAY: Type: DURABLE MEDICAL EQUIPMENT

## (undated) DEVICE — DRILL BIT STRYKER ORTHO LOKG 4.2X360MM

## (undated) DEVICE — SOL IRR POUR H2O 1500ML

## (undated) DEVICE — GLV 7 PROTEXIS (WHITE)

## (undated) DEVICE — GLV 7.5 PROTEXIS (BLUE)

## (undated) DEVICE — SUCTION YANKAUER NO CONTROL VENT

## (undated) DEVICE — ELCTR GROUNDING PAD ADULT COVIDIEN

## (undated) DEVICE — SUT MONOCRYL 4-0 27" PS-2 UNDYED

## (undated) DEVICE — ELCTR BOVIE PENCIL SMOKE EVACUATION

## (undated) DEVICE — SUT VICRYL 0 27" OS-6 UNDYED

## (undated) DEVICE — DRAPE SHOWER CURTAIN ISOLATION

## (undated) DEVICE — STAPLER SKIN VISI-STAT 35 WIDE

## (undated) DEVICE — DRAPE 3/4 SHEET 52X76"

## (undated) DEVICE — GOWN XXL

## (undated) DEVICE — PACK LIJ BASIC ORTHO

## (undated) DEVICE — PREP SCRUB BRUSH W CHG 4%

## (undated) DEVICE — LABELS BLANK W PEN

## (undated) DEVICE — POSITIONER STRAP ARMBOARD VELCRO TS-30

## (undated) DEVICE — VENODYNE/SCD SLEEVE CALF MEDIUM

## (undated) DEVICE — LAP PAD W RING 18 X 18"

## (undated) DEVICE — DRAPE U POLY BLUE 60"X60"